# Patient Record
Sex: MALE | Race: WHITE | NOT HISPANIC OR LATINO | Employment: OTHER | URBAN - METROPOLITAN AREA
[De-identification: names, ages, dates, MRNs, and addresses within clinical notes are randomized per-mention and may not be internally consistent; named-entity substitution may affect disease eponyms.]

---

## 2017-03-13 ENCOUNTER — DOCUMENTATION ONLY (OUTPATIENT)
Dept: OTHER | Facility: CLINIC | Age: 57
End: 2017-03-13

## 2017-03-13 DIAGNOSIS — Z71.89 ACP (ADVANCE CARE PLANNING): Chronic | ICD-10-CM

## 2017-10-13 DIAGNOSIS — I10 ESSENTIAL HYPERTENSION, BENIGN: ICD-10-CM

## 2017-10-14 NOTE — TELEPHONE ENCOUNTER
lisinopril (PRINIVIL,ZESTRIL) 20 MG tablet      Last Written Prescription Date: 11/1/16  Last Fill Quantity: 90, # refills: 3  Last Office Visit with G, [facility] or [facility] prescribing provider: 11/01/16       Potassium   Date Value Ref Range Status   11/01/2016 4.8 3.4 - 5.3 mmol/L Final     Creatinine   Date Value Ref Range Status   11/01/2016 0.93 0.66 - 1.25 mg/dL Final     BP Readings from Last 3 Encounters:   11/01/16 138/78   08/04/15 148/80   06/10/15 144/86

## 2017-10-16 RX ORDER — LISINOPRIL 20 MG/1
TABLET ORAL
Qty: 90 TABLET | Refills: 0 | Status: SHIPPED | OUTPATIENT
Start: 2017-10-16 | End: 2018-04-02

## 2018-01-04 DIAGNOSIS — I10 ESSENTIAL HYPERTENSION, BENIGN: ICD-10-CM

## 2018-01-05 NOTE — TELEPHONE ENCOUNTER
Requested Prescriptions   Pending Prescriptions Disp Refills     lisinopril (PRINIVIL/ZESTRIL) 20 MG tablet [Pharmacy Med Name: LISINOPRIL  20MG  TAB]  Last Written Prescription Date:  10/16/2017  Last Fill Quantity: 90,  # refills: 0   Last Office Visit with [facility], [facility] or [facility] prescribing provider:  11/01/2016   Future Office Visit:      90 tablet      Sig: TAKE 1 TABLET BY MOUTH  DAILY    ACE Inhibitors (Including Combos) Protocol Failed    1/4/2018  8:19 PM       Failed - Blood pressure under 140/90    BP Readings from Last 3 Encounters:   11/01/16 138/78   08/04/15 148/80   06/10/15 144/86                Failed - Recent or future visit with authorizing provider's specialty    Patient had office visit in the last year or has a visit in the next 30 days with authorizing provider.  See chart review.              Failed - Normal serum creatinine on file in past 12 months    Recent Labs   Lab Test  11/01/16   1039   CR  0.93            Failed - Normal serum potassium on file in past 12 months    Recent Labs   Lab Test  11/01/16   1039   POTASSIUM  4.8            Passed - Patient is age 18 or older

## 2018-01-08 RX ORDER — LISINOPRIL 20 MG/1
TABLET ORAL
Qty: 90 TABLET | OUTPATIENT
Start: 2018-01-08

## 2018-01-08 NOTE — TELEPHONE ENCOUNTER
Routing refill request to provider for review/approval because:  Vera given x1 and patient did not follow up, please advise  Patient needs to be seen because it has been more than 1 year since last office visit.

## 2018-04-02 ENCOUNTER — OFFICE VISIT (OUTPATIENT)
Dept: INTERNAL MEDICINE | Facility: CLINIC | Age: 58
End: 2018-04-02
Payer: COMMERCIAL

## 2018-04-02 VITALS
HEIGHT: 70 IN | SYSTOLIC BLOOD PRESSURE: 130 MMHG | OXYGEN SATURATION: 97 % | TEMPERATURE: 98 F | WEIGHT: 169.3 LBS | HEART RATE: 87 BPM | BODY MASS INDEX: 24.24 KG/M2 | DIASTOLIC BLOOD PRESSURE: 80 MMHG | RESPIRATION RATE: 13 BRPM

## 2018-04-02 DIAGNOSIS — I10 ESSENTIAL HYPERTENSION, BENIGN: Primary | ICD-10-CM

## 2018-04-02 DIAGNOSIS — Z12.11 COLON CANCER SCREENING: ICD-10-CM

## 2018-04-02 DIAGNOSIS — I82.4Y2 DEEP VEIN THROMBOSIS (DVT) OF PROXIMAL VEIN OF LEFT LOWER EXTREMITY, UNSPECIFIED CHRONICITY (H): ICD-10-CM

## 2018-04-02 DIAGNOSIS — Z12.5 SPECIAL SCREENING FOR MALIGNANT NEOPLASM OF PROSTATE: ICD-10-CM

## 2018-04-02 DIAGNOSIS — E78.5 HYPERLIPIDEMIA LDL GOAL <130: ICD-10-CM

## 2018-04-02 LAB
ALBUMIN SERPL-MCNC: 3.9 G/DL (ref 3.4–5)
ALP SERPL-CCNC: 66 U/L (ref 40–150)
ALT SERPL W P-5'-P-CCNC: 53 U/L (ref 0–70)
ANION GAP SERPL CALCULATED.3IONS-SCNC: 8 MMOL/L (ref 3–14)
AST SERPL W P-5'-P-CCNC: 18 U/L (ref 0–45)
BILIRUB SERPL-MCNC: 2.3 MG/DL (ref 0.2–1.3)
BUN SERPL-MCNC: 11 MG/DL (ref 7–30)
CALCIUM SERPL-MCNC: 8.8 MG/DL (ref 8.5–10.1)
CHLORIDE SERPL-SCNC: 103 MMOL/L (ref 94–109)
CHOLEST SERPL-MCNC: 216 MG/DL
CO2 SERPL-SCNC: 28 MMOL/L (ref 20–32)
CREAT SERPL-MCNC: 0.79 MG/DL (ref 0.66–1.25)
GFR SERPL CREATININE-BSD FRML MDRD: >90 ML/MIN/1.7M2
GLUCOSE SERPL-MCNC: 115 MG/DL (ref 70–99)
HDLC SERPL-MCNC: 76 MG/DL
LDLC SERPL CALC-MCNC: 126 MG/DL
NONHDLC SERPL-MCNC: 140 MG/DL
POTASSIUM SERPL-SCNC: 4.4 MMOL/L (ref 3.4–5.3)
PROT SERPL-MCNC: 6.8 G/DL (ref 6.8–8.8)
PSA SERPL-ACNC: 3.35 UG/L (ref 0–4)
SODIUM SERPL-SCNC: 139 MMOL/L (ref 133–144)
TRIGL SERPL-MCNC: 71 MG/DL

## 2018-04-02 PROCEDURE — G0103 PSA SCREENING: HCPCS | Performed by: INTERNAL MEDICINE

## 2018-04-02 PROCEDURE — 80061 LIPID PANEL: CPT | Performed by: INTERNAL MEDICINE

## 2018-04-02 PROCEDURE — 36415 COLL VENOUS BLD VENIPUNCTURE: CPT | Performed by: INTERNAL MEDICINE

## 2018-04-02 PROCEDURE — 99214 OFFICE O/P EST MOD 30 MIN: CPT | Performed by: INTERNAL MEDICINE

## 2018-04-02 PROCEDURE — 80053 COMPREHEN METABOLIC PANEL: CPT | Performed by: INTERNAL MEDICINE

## 2018-04-02 RX ORDER — LISINOPRIL 20 MG/1
TABLET ORAL
Qty: 90 TABLET | Refills: 3 | Status: SHIPPED | OUTPATIENT
Start: 2018-04-02 | End: 2019-05-08

## 2018-04-02 ASSESSMENT — PAIN SCALES - GENERAL: PAINLEVEL: NO PAIN (0)

## 2018-04-02 NOTE — PROGRESS NOTES
SUBJECTIVE:   Kasi Krause is a 57 year old male who presents to clinic today for the following health issues:    Hypertension Follow-up      Outpatient blood pressures are not being checked.    Low Salt Diet: not monitoring salt      Amount of exercise or physical activity: None    Problems taking medications regularly: No    Medication side effects: none    Diet: regular (no restrictions)    Problem list and histories reviewed & adjusted, as indicated.  Additional history: as documented    Patient Active Problem List   Diagnosis     Hyperlipidemia LDL goal <130     Essential hypertension, benign     Male erectile disorder     ACP (advance care planning)     Deep vein thrombosis (DVT) of proximal vein of left lower extremity, unspecified chronicity (H)     Past Surgical History:   Procedure Laterality Date     broken finger pinky [       COLONOSCOPY  2014     ORTHOPEDIC SURGERY       SOFT TISSUE SURGERY  2000    Torn left achilles tendon       Social History   Substance Use Topics     Smoking status: Never Smoker     Smokeless tobacco: Never Used     Alcohol use 1.5 - 2.0 oz/week     Family History   Problem Relation Age of Onset     Family history unknown: Yes     DIABETES Mother      DIABETES Father          Current Outpatient Prescriptions   Medication Sig Dispense Refill     lisinopril (PRINIVIL/ZESTRIL) 20 MG tablet TAKE 1 TABLET BY MOUTH  DAILY 90 tablet 0     VITAMIN D, CHOLECALCIFEROL, PO Take 5,000 Units by mouth daily       ASPIRIN PO Take 81 mg by mouth once       ORDER FOR DME Equipment being ordered: compression stocking- one pair 1 Device 0     ORDER FOR DME Compression stockings-1 pair with 30-40MM pressure 1 Application 1     No Known Allergies  BP Readings from Last 3 Encounters:   11/01/16 138/78   08/04/15 148/80   06/10/15 144/86    Wt Readings from Last 3 Encounters:   11/01/16 170 lb 1.6 oz (77.2 kg)   08/04/15 168 lb 8 oz (76.4 kg)   06/10/15 170 lb (77.1 kg)           Labs reviewed in  "EPIC    Reviewed and updated as needed this visit by clinical staff       Reviewed and updated as needed this visit by Provider         ROS:  CONSTITUTIONAL: NEGATIVE for fever, chills, change in weight  INTEGUMENTARY/SKIN: NEGATIVE for worrisome rashes, moles or lesions  ENT/MOUTH: NEGATIVE for ear, mouth and throat problems  RESP: NEGATIVE for significant cough or SOB  CV: NEGATIVE for chest pain, palpitations or peripheral edema  GI: NEGATIVE for nausea, abdominal pain, heartburn, or change in bowel habits  : NEGATIVE for frequency, dysuria, or hematuria  MUSCULOSKELETAL: NEGATIVE for significant arthralgias or myalgia  HEME: NEGATIVE for bleeding problems  PSYCHIATRIC: NEGATIVE for changes in mood or affect    OBJECTIVE:                                                    /80  Pulse 87  Temp 98  F (36.7  C) (Oral)  Resp 13  Ht 5' 10\" (1.778 m)  Wt 169 lb 4.8 oz (76.8 kg)  SpO2 97%  BMI 24.29 kg/m2  Body mass index is 24.29 kg/(m^2).  GENERAL: healthy, alert and no distress  RESP: lungs clear to auscultation - no rales, no rhonchi, no wheezes  CV: regular rates and rhythm, normal S1 S2, no S3 or S4 and no murmur, no click or rub -  MS: extremities- no gross deformities noted, trace edema  NEURO: strength and tone- normal, sensory exam- grossly normal, mentation- intact, speech- normal, reflexes- symmetric  PSYCH: Alert and oriented times 3; speech- coherent , normal rate and volume; able to articulate logical thoughts, able to abstract reason, no tangential thoughts, no hallucinations or delusions, affect- normal     ASSESSMENT/PLAN:                                                      (I10) Essential hypertension, benign  (primary encounter diagnosis)  Comment: Stable on current therapy continue with meds as ordered  Plan: lisinopril (PRINIVIL/ZESTRIL) 20 MG tablet,         Comprehensive metabolic panel            (E78.5) Hyperlipidemia LDL goal <130  Comment: Labs ordered fasting per " routine  Plan: Comprehensive metabolic panel, Lipid Profile            (I82.4Y2) Deep vein thrombosis (DVT) of proximal vein of left lower extremity, unspecified chronicity (H)  Comment: No recurrence continue with aspirin daily.  Plan:     (Z12.5) Special screening for malignant neoplasm of prostate  Comment: Ordered as routine screening per age  Plan: PSA, screen            (Z12.11) Colon cancer screening  Comment: Advised and discussed updated colonoscopy per  Plan: Fecal colorectal cancer screen (FIT)              See Patient Instructions and was advised that he should complete physical examination at which time updated completion of laboratory tests including hepatitis C and hemoglobin study will be done.  She was also advised about the benefits of the shingles vaccination    Kasi Cassidy MD  [facility]    25 minutes spent with this patient, face to face, discussing treatment options for listed problems above as well as side effects of appropriate medications.  Counseling time extended beyond 50% of the clinic visit.  Medication dosing, treatment plan and follow-up were discussed. Also reviewed need for primary care testing for patient.

## 2018-04-02 NOTE — MR AVS SNAPSHOT
After Visit Summary   4/2/2018    Kasi Krause    MRN: [id number]           Patient Information     Date Of Birth          1960        Visit Information        Provider Department      4/2/2018 8:20 AM Kasi Cassidy MD [facility]        Today's Diagnoses     Essential hypertension, benign    -  1    Hyperlipidemia LDL goal <130        Deep vein thrombosis (DVT) of proximal vein of left lower extremity, unspecified chronicity (H)        Special screening for malignant neoplasm of prostate        Colon cancer screening           Follow-ups after your visit        Follow-up notes from your care team     Return if symptoms worsen or fail to improve, for Physical Exam.      Future tests that were ordered for you today     Open Future Orders        Priority Expected Expires Ordered    Fecal colorectal cancer screen (FIT) Routine 4/23/2018 6/25/2018 4/2/2018            Who to contact     If you have questions or need follow up information about today's clinic visit or your schedule please contact [facility] directly at [phone].  Normal or non-critical lab and imaging results will be communicated to you by [employer], letter or phone within 4 business days after the clinic has received the results. If you do not hear from us within 7 days, please contact the clinic through [employer] or phone. If you have a critical or abnormal lab result, we will notify you by phone as soon as possible.  Submit refill requests through [employer] or call your pharmacy and they will forward the refill request to us. Please allow 3 business days for your refill to be completed.          Additional Information About Your Visit        MyChart Information     [employer] gives you secure access to your electronic health record. If you see a primary care provider, you can also send messages to your care team and make appointments. If you have questions, please call your primary  "care clinic.  If you do not have a primary care provider, please call [phone] and they will assist you.        Care EveryWhere ID     This is your Care EveryWhere ID. This could be used by other organizations to access your [place] medical records  [id number]        Your Vitals Were     Pulse Temperature Respirations Height Pulse Oximetry BMI (Body Mass Index)    87 98  F (36.7  C) (Oral) 13 5' 10\" (1.778 m) 97% 24.29 kg/m2       Blood Pressure from Last 3 Encounters:   04/02/18 130/80   11/01/16 138/78   08/04/15 148/80    Weight from Last 3 Encounters:   04/02/18 169 lb 4.8 oz (76.8 kg)   11/01/16 170 lb 1.6 oz (77.2 kg)   08/04/15 168 lb 8 oz (76.4 kg)              We Performed the Following     Comprehensive metabolic panel     Lipid Profile     PSA, screen          Today's Medication Changes          These changes are accurate as of 4/2/18  8:51 AM.  If you have any questions, ask your nurse or doctor.               These medicines have changed or have updated prescriptions.        Dose/Directions    lisinopril 20 MG tablet   Commonly known as:  PRINIVIL/ZESTRIL   This may have changed:  See the new instructions.   Used for:  Essential hypertension, benign   Changed by:  Ksai Cassidy MD        TAKE 1 TABLET BY MOUTH  DAILY   Quantity:  90 tablet   Refills:  3            Where to get your medicines      These medications were sent to [facility] - [address], [facility] [address]     Phone:  [phone]     lisinopril 20 MG tablet                Primary Care Provider Office Phone # Fax #    Kasi Cassidy [phone]       [address] [place] [id number]        Equal Access to Services     JUAN RAMON FELIZ : Analia pinto Sofaraz, waaxda luqadaha, qaybta kaalmada dorina, branden kingsley. So [facility] [phone].    ATENCIÓN: Si habla español, tiene a de souza disposición servicios " kash de asistencia lingüística. Barry urias [phone].    We comply with applicable federal civil rights laws and [place] laws. We do not discriminate on the basis of race, color, national origin, age, disability, sex, sexual orientation, or gender identity.            Thank you!     Thank you for choosing [facility]  for your care. Our goal is always to provide you with excellent care. Hearing back from our patients is one way we can continue to improve our services. Please take a few minutes to complete the written survey that you may receive in the mail after your visit with us. Thank you!             Your Updated Medication List - Protect others around you: Learn how to safely use, store and throw away your medicines at www.disposemymeds.org.          This list is accurate as of 4/2/18  8:51 AM.  Always use your most recent med list.                   Brand Name Dispense Instructions for use Diagnosis    ASPIRIN PO      Take 81 mg by mouth once        lisinopril 20 MG tablet    PRINIVIL/ZESTRIL    90 tablet    TAKE 1 TABLET BY MOUTH  DAILY    Essential hypertension, benign       order for DME     1 Application    Compression stockings-1 pair with 30-40MM pressure    DVT (deep venous thrombosis), unspecified laterality       order for DME     1 Device    Equipment being ordered: compression stocking- one pair    DVT (deep venous thrombosis), unspecified laterality       VITAMIN D (CHOLECALCIFEROL) PO      Take 5,000 Units by mouth daily

## 2018-04-02 NOTE — LETTER
[facility]  [address] [address]  [phone]      4/2/2018       Kasi Krause  [address] [facility] [address]        Dear Kasi,    I am pleased to inform you that your routine blood work including your sodium, potassium, calcium and kidney function tests are all normal.    One of you liver function tests is just slightly abnormal but stable and should be rechecked here in the clinic in 6 months with a follow-up visit with me.  I will look forward to seeing you at that time and please call to make an appointment.    Your blood sugar function test is also slightly abnormal and elevated and should be rechecked here in the clinic in 6 months with a follow-up visit with me, fasting.  I will look forward to seeing you at that time and please call to make an appointment.  In the meantime, please work on your diet limiting your carbohydrates and getting some good, regular exercise.    Your total and LDL cholesterol levels are slightly high and could be treated more aggressively.  Please follow-up with me to discuss your further options if you are interested.    In addition, your PSA or prostate screening antigen is stable and should be repeated annually.      Sincerely,      Kasi Cassidy MD  Internal Medicine

## 2018-08-10 ENCOUNTER — E-VISIT (OUTPATIENT)
Dept: INTERNAL MEDICINE | Facility: CLINIC | Age: 58
End: 2018-08-10
Payer: COMMERCIAL

## 2018-08-10 DIAGNOSIS — J02.0 STREPTOCOCCAL SORE THROAT: Primary | ICD-10-CM

## 2018-08-10 DIAGNOSIS — J02.0 STREPTOCOCCAL SORE THROAT: ICD-10-CM

## 2018-08-10 LAB
DEPRECATED S PYO AG THROAT QL EIA: NORMAL
SPECIMEN SOURCE: NORMAL

## 2018-08-10 PROCEDURE — 99444 ZZC PHYSICIAN ONLINE EVALUATION & MANAGEMENT SERVICE: CPT | Performed by: INTERNAL MEDICINE

## 2018-08-10 PROCEDURE — 87880 STREP A ASSAY W/OPTIC: CPT | Performed by: INTERNAL MEDICINE

## 2018-08-10 PROCEDURE — 87081 CULTURE SCREEN ONLY: CPT | Performed by: INTERNAL MEDICINE

## 2018-08-11 LAB
BACTERIA SPEC CULT: NORMAL
SPECIMEN SOURCE: NORMAL

## 2019-05-08 ENCOUNTER — MYC REFILL (OUTPATIENT)
Dept: INTERNAL MEDICINE | Facility: CLINIC | Age: 59
End: 2019-05-08

## 2019-05-08 DIAGNOSIS — I10 ESSENTIAL HYPERTENSION, BENIGN: ICD-10-CM

## 2019-05-08 RX ORDER — LISINOPRIL 20 MG/1
TABLET ORAL
Qty: 30 TABLET | Refills: 0 | Status: SHIPPED | OUTPATIENT
Start: 2019-05-08 | End: 2019-06-04

## 2019-05-08 NOTE — LETTER
[facility]  [address] [address]  [phone]            Kasi Krause  [address] [facility] [address]        May 8, 2019    Dear Kasi,    While refilling your prescription today, we noticed that you are due for an appointment with your provider.  We will refill your prescription for 30 days, but a follow-up appointment must be made before any additional refills can be approved.     Taking care of your health is important to us and we look forward to seeing you in the near future.  Please call us at [phone] or [phone] (or use [employer]) to schedule an appointment.     Please disregard this notice if you have already made an appointment.    Sincerely,        [facility]

## 2019-05-08 NOTE — TELEPHONE ENCOUNTER
"Requested Prescriptions   Pending Prescriptions Disp Refills     lisinopril (PRINIVIL/ZESTRIL) 20 MG tablet 90 tablet 3     Sig: TAKE 1 TABLET BY MOUTH  DAILY       ACE Inhibitors (Including Combos) Protocol Failed - 5/8/2019  9:40 AM        Failed - Blood pressure under 140/90 in past 12 months     BP Readings from Last 3 Encounters:   04/02/18 130/80   11/01/16 138/78   08/04/15 148/80                 Failed - Recent (12 mo) or future (30 days) visit within the authorizing provider's specialty     Patient had office visit in the last 12 months or has a visit in the next 30 days with authorizing provider or within the authorizing provider's specialty.  See \"Patient Info\" tab in inbasket, or \"Choose Columns\" in Meds & Orders section of the refill encounter.              Failed - Normal serum creatinine on file in past 12 months     Recent Labs   Lab Test 04/02/18  0859   CR 0.79             Failed - Normal serum potassium on file in past 12 months     Recent Labs   Lab Test 04/02/18  0859   POTASSIUM 4.4             Passed - Medication is active on med list        Passed - Patient is age 18 or older        Medication is being filled for 1 time refill only due to:  Patient needs to be seen because it has been more than one year since last visit.    "

## 2019-06-02 ENCOUNTER — MYC MEDICAL ADVICE (OUTPATIENT)
Dept: INTERNAL MEDICINE | Facility: CLINIC | Age: 59
End: 2019-06-02

## 2019-06-04 ENCOUNTER — OFFICE VISIT (OUTPATIENT)
Dept: INTERNAL MEDICINE | Facility: CLINIC | Age: 59
End: 2019-06-04
Payer: COMMERCIAL

## 2019-06-04 VITALS
RESPIRATION RATE: 15 BRPM | HEART RATE: 91 BPM | SYSTOLIC BLOOD PRESSURE: 130 MMHG | OXYGEN SATURATION: 97 % | TEMPERATURE: 97.4 F | WEIGHT: 169.2 LBS | BODY MASS INDEX: 24.22 KG/M2 | HEIGHT: 70 IN | DIASTOLIC BLOOD PRESSURE: 70 MMHG

## 2019-06-04 DIAGNOSIS — I82.4Y2 DEEP VEIN THROMBOSIS (DVT) OF PROXIMAL VEIN OF LEFT LOWER EXTREMITY, UNSPECIFIED CHRONICITY (H): ICD-10-CM

## 2019-06-04 DIAGNOSIS — I10 ESSENTIAL HYPERTENSION, BENIGN: ICD-10-CM

## 2019-06-04 DIAGNOSIS — Z12.5 SCREENING PSA (PROSTATE SPECIFIC ANTIGEN): ICD-10-CM

## 2019-06-04 DIAGNOSIS — E78.5 HYPERLIPIDEMIA LDL GOAL <130: ICD-10-CM

## 2019-06-04 DIAGNOSIS — Z12.11 COLON CANCER SCREENING: ICD-10-CM

## 2019-06-04 DIAGNOSIS — Z00.00 ENCOUNTER FOR ROUTINE ADULT HEALTH EXAMINATION WITHOUT ABNORMAL FINDINGS: Primary | ICD-10-CM

## 2019-06-04 LAB
ALBUMIN SERPL-MCNC: 4 G/DL (ref 3.4–5)
ALP SERPL-CCNC: 59 U/L (ref 40–150)
ALT SERPL W P-5'-P-CCNC: 32 U/L (ref 0–70)
ANION GAP SERPL CALCULATED.3IONS-SCNC: 7 MMOL/L (ref 3–14)
AST SERPL W P-5'-P-CCNC: 15 U/L (ref 0–45)
BILIRUB SERPL-MCNC: 1.1 MG/DL (ref 0.2–1.3)
BUN SERPL-MCNC: 9 MG/DL (ref 7–30)
CALCIUM SERPL-MCNC: 8.6 MG/DL (ref 8.5–10.1)
CHLORIDE SERPL-SCNC: 109 MMOL/L (ref 94–109)
CHOLEST SERPL-MCNC: 222 MG/DL
CO2 SERPL-SCNC: 23 MMOL/L (ref 20–32)
CREAT SERPL-MCNC: 0.73 MG/DL (ref 0.66–1.25)
GFR SERPL CREATININE-BSD FRML MDRD: >90 ML/MIN/{1.73_M2}
GLUCOSE SERPL-MCNC: 87 MG/DL (ref 70–99)
HDLC SERPL-MCNC: 77 MG/DL
HGB BLD-MCNC: 16 G/DL (ref 13.3–17.7)
LDLC SERPL CALC-MCNC: 126 MG/DL
NONHDLC SERPL-MCNC: 145 MG/DL
POTASSIUM SERPL-SCNC: 4.6 MMOL/L (ref 3.4–5.3)
PROT SERPL-MCNC: 7.1 G/DL (ref 6.8–8.8)
PSA SERPL-ACNC: 3.08 UG/L (ref 0–4)
SODIUM SERPL-SCNC: 139 MMOL/L (ref 133–144)
TRIGL SERPL-MCNC: 96 MG/DL

## 2019-06-04 PROCEDURE — G0103 PSA SCREENING: HCPCS | Performed by: INTERNAL MEDICINE

## 2019-06-04 PROCEDURE — 80061 LIPID PANEL: CPT | Performed by: INTERNAL MEDICINE

## 2019-06-04 PROCEDURE — 80053 COMPREHEN METABOLIC PANEL: CPT | Performed by: INTERNAL MEDICINE

## 2019-06-04 PROCEDURE — 85018 HEMOGLOBIN: CPT | Performed by: INTERNAL MEDICINE

## 2019-06-04 PROCEDURE — 99396 PREV VISIT EST AGE 40-64: CPT | Performed by: INTERNAL MEDICINE

## 2019-06-04 PROCEDURE — 36415 COLL VENOUS BLD VENIPUNCTURE: CPT | Performed by: INTERNAL MEDICINE

## 2019-06-04 RX ORDER — CETIRIZINE HYDROCHLORIDE 10 MG/1
10 TABLET ORAL DAILY
COMMUNITY
End: 2024-01-11

## 2019-06-04 RX ORDER — LISINOPRIL 20 MG/1
TABLET ORAL
Qty: 90 TABLET | Refills: 3 | Status: SHIPPED | OUTPATIENT
Start: 2019-06-04 | End: 2020-07-21

## 2019-06-04 ASSESSMENT — MIFFLIN-ST. JEOR: SCORE: 1593.74

## 2019-06-04 NOTE — LETTER
[facility]  [address] [address]  [phone]      6/4/2019       Kasi Karuse  [address] [facility] [address]        Dear Kasi,    I am pleased to inform you that your routine blood work including your hemoglobin, sodium, potassium, calcium, kidney and liver function tests are all normal.    Your cholesterol is slightly high and could be treated more aggressively with better diet, exercise and weight loss.  Please follow-up with me to discuss your further medication options/changes if you are interested otherwise I would recheck this level in 6 months after some good exercise and dietary changes.    In addition, your PSA or prostate screening antigen is stable and should be repeated annually.    Sincerely,      Kasi Cassidy MD  Internal Medicine

## 2019-06-04 NOTE — PROGRESS NOTES
SUBJECTIVE:   CC: Kasi Krause is an 58 year old male who presents for preventive health visit.     Answers for HPI/ROS submitted by the patient on 6/2/2019   Annual Exam:  Frequency of exercise:: 2-3 days/week  Getting at least 3 servings of Calcium per day:: Yes  Diet:: Regular (no restrictions)  Taking medications regularly:: Yes  Medication side effects:: Not applicable  Bi-annual eye exam:: Yes  Dental care twice a year:: NO  Sleep apnea or symptoms of sleep apnea:: None  Additional concerns today:: No  Duration of exercise:: 15-30 minutes    Today's PHQ-2 Score:   PHQ-2 ( 1999 [employer]) 6/2/2019 4/2/2018   Q1: Little interest or pleasure in doing things 0 0   Q2: Feeling down, depressed or hopeless 0 0   PHQ-2 Score 0 0   Q1: Little interest or pleasure in doing things Not at all -   Q2: Feeling down, depressed or hopeless Not at all -   PHQ-2 Score 0 -       Abuse: Current or Past(Physical, Sexual or Emotional)- No  Do you feel safe in your environment? Yes    Social History     Tobacco Use     Smoking status: Never Smoker     Smokeless tobacco: Never Used   Substance Use Topics     Alcohol use: Yes     Alcohol/week: 1.5 - 2.0 oz     If you drink alcohol do you typically have >3 drinks per day or >7 drinks per week? No                      Last PSA:   PSA   Date Value Ref Range Status   04/02/2018 3.35 0 - 4 ug/L Final     Comment:     Assay Method:  Chemiluminescence using Siemens Vista analyzer       Reviewed orders with patient. Reviewed health maintenance and updated orders accordingly - Yes    Reviewed and updated as needed this visit by clinical staff       Reviewed and updated as needed this visit by Provider        ROS:  CONSTITUTIONAL: NEGATIVE for fever, chills, change in weight  INTEGUMENTARY/SKIN: NEGATIVE for worrisome rashes, moles or lesions  EYES: NEGATIVE for vision changes or irritation  ENT: NEGATIVE for ear, mouth and throat problems  RESP: NEGATIVE for significant cough or SOB  CV:  "NEGATIVE for chest pain, palpitations or peripheral edema  GI: NEGATIVE for nausea, abdominal pain, heartburn, or change in bowel habits   male: negative for dysuria, hematuria, decreased urinary stream, erectile dysfunction, urethral discharge  MUSCULOSKELETAL: NEGATIVE for significant arthralgias or myalgia  NEURO: NEGATIVE for weakness, dizziness or paresthesias  PSYCHIATRIC: NEGATIVE for changes in mood or affect    OBJECTIVE:   /70   Pulse 91   Temp 97.4  F (36.3  C) (Oral)   Resp 15   Ht 1.778 m (5' 10\")   Wt 76.7 kg (169 lb 3.2 oz)   SpO2 97%   BMI 24.28 kg/m    EXAM:  GENERAL: healthy, alert and no distress  EYES: Eyes grossly normal to inspection, PERRL and conjunctivae and sclerae normal  HENT: ear canals and TM's normal, nose and mouth without ulcers or lesions  NECK: no adenopathy, no asymmetry, masses, or scars and thyroid normal to palpation  RESP: lungs clear to auscultation - no rales, rhonchi or wheezes  CV: regular rate and rhythm, normal S1 S2, no S3 or S4, no murmur, click or rub, no peripheral edema and peripheral pulses strong  ABDOMEN: soft, nontender, no hepatosplenomegaly, no masses and bowel sounds normal  RECTAL: normal sphincter tone, no rectal masses, prostate normal size, smooth, nontender without nodules or masses, small external skin tags noted consistent with prior hemorrhoids.  .  MS: no gross musculoskeletal defects noted, no edema  NEURO: Normal strength and tone, mentation intact and speech normal  PSYCH: mentation appears normal, affect normal/bright    ASSESSMENT/PLAN:   1. Encounter for routine adult health examination without abnormal findings  Advised shingles vaccination for patient is up-to-date.  - Hemoglobin  - Comprehensive metabolic panel  - Lipid Profile    Recommended HIV screening as well as hepatitis C screening patient not interested at present time and will check with insurance    2. Essential hypertension, benign  Stable on therapy continuing with " "current medical management  - lisinopril (PRINIVIL/ZESTRIL) 20 MG tablet; TAKE 1 TABLET BY MOUTH  DAILY  Dispense: 90 tablet; Refill: 3  - Comprehensive metabolic panel    3. Deep vein thrombosis (DVT) of proximal vein of left lower extremity, unspecified chronicity (H)  Prior disease followed by Dr. Gonzalez 2015    4. Hyperlipidemia LDL goal <130  Labs ordered as fasting per routine  - Lipid Profile    5. Screening PSA (prostate specific antigen)  Ordered PSA as routine screening per age  - PSA, screen    6. Colon cancer screening  Prior colonoscopy reviewed and will be due for update next year  - Fecal colorectal cancer screen (FIT); Future    COUNSELING:  Reviewed preventive health counseling, as reflected in patient instructions       Regular exercise       Healthy diet/nutrition    Estimated body mass index is 24.29 kg/m  as calculated from the following:    Height as of 4/2/18: 1.778 m (5' 10\").    Weight as of 4/2/18: 76.8 kg (169 lb 4.8 oz).     reports that he has never smoked. He has never used smokeless tobacco.    Counseling Resources:  ATP IV Guidelines  Pooled Cohorts Equation Calculator  FRAX Risk Assessment  ICSI Preventive Guidelines  Dietary Guidelines for Americans, 2010  [employer]'s MyPlate  ASA Prophylaxis  Lung CA Screening    Kasi Cassidy MD  [facility]    THE MEDICATION LIST HAS BEEN FULLY RECONCILED BY THE M.D. AND THE NURSING STAFF.    "

## 2019-10-03 ENCOUNTER — HEALTH MAINTENANCE LETTER (OUTPATIENT)
Age: 59
End: 2019-10-03

## 2020-07-07 DIAGNOSIS — I10 ESSENTIAL HYPERTENSION, BENIGN: ICD-10-CM

## 2020-07-21 RX ORDER — LISINOPRIL 20 MG/1
TABLET ORAL
Qty: 90 TABLET | Refills: 0 | Status: SHIPPED | OUTPATIENT
Start: 2020-07-21 | End: 2020-08-25

## 2020-07-21 NOTE — TELEPHONE ENCOUNTER
Patient called back made a video visit with provider on 7/23/2020 for medication check any questions call patient back at [phone]

## 2020-08-25 ENCOUNTER — VIRTUAL VISIT (OUTPATIENT)
Dept: INTERNAL MEDICINE | Facility: CLINIC | Age: 60
End: 2020-08-25
Payer: COMMERCIAL

## 2020-08-25 DIAGNOSIS — I10 ESSENTIAL HYPERTENSION, BENIGN: ICD-10-CM

## 2020-08-25 DIAGNOSIS — E78.5 HYPERLIPIDEMIA LDL GOAL <130: Primary | ICD-10-CM

## 2020-08-25 DIAGNOSIS — Z12.5 SCREENING PSA (PROSTATE SPECIFIC ANTIGEN): ICD-10-CM

## 2020-08-25 DIAGNOSIS — Z12.11 COLON CANCER SCREENING: ICD-10-CM

## 2020-08-25 PROCEDURE — 99213 OFFICE O/P EST LOW 20 MIN: CPT | Mod: 95 | Performed by: INTERNAL MEDICINE

## 2020-08-25 RX ORDER — LISINOPRIL 20 MG/1
20 TABLET ORAL DAILY
Qty: 90 TABLET | Refills: 3 | Status: SHIPPED | OUTPATIENT
Start: 2020-08-25 | End: 2021-08-04

## 2020-08-25 NOTE — PROGRESS NOTES
"Kasi Krause is a 59 year old male who is being evaluated via a billable telephone visit.      The patient has been notified of following:     \"This telephone visit will be conducted via a call between you and your physician/provider. We have found that certain health care needs can be provided without the need for a physical exam.  This service lets us provide the care you need with a short phone conversation.  If a prescription is necessary we can send it directly to your pharmacy.  If lab work is needed we can place an order for that and you can then stop by our lab to have the test done at a later time.    Telephone visits are billed at different rates depending on your insurance coverage. During this emergency period, for some insurers they may be billed the same as an in-person visit.  Please reach out to your insurance provider with any questions.    If during the course of the call the physician/provider feels a telephone visit is not appropriate, you will not be charged for this service.\"    Patient has given verbal consent for Telephone visit?  Yes    What phone number would you like to be contacted at? [phone]    How would you like to obtain your AVS? [facility]    Subjective     Kasi Krause is a 59 year old male who presents via phone visit today for the following health issues:    HPI    Hypertension Follow-up      Do you check your blood pressure regularly outside of the clinic? No     Are you following a low salt diet? No    Are your blood pressures ever more than 140 on the top number (systolic) OR more   than 90 on the bottom number (diastolic), for example 140/90? No      How many servings of fruits and vegetables do you eat daily?  2-3    On average, how many sweetened beverages do you drink each day (Examples: soda, juice, sweet tea, etc.  Do NOT count diet or artificially sweetened beverages)?   0    How many days per week do you exercise enough to make your heart beat faster? 3 or " less    How many minutes a day do you exercise enough to make your heart beat faster? 10 - 19    How many days per week do you miss taking your medication? 0       Review of Systems   CONSTITUTIONAL: NEGATIVE for fever, chills, change in weight  ENT/MOUTH: NEGATIVE for ear, mouth and throat problems  RESP: NEGATIVE for significant cough or SOB  CV: NEGATIVE for chest pain, palpitations or peripheral edema  GI: NEGATIVE for nausea, abdominal pain, heartburn, or change in bowel habits  : NEGATIVE for frequency, dysuria, or hematuria  MUSCULOSKELETAL: NEGATIVE for significant arthralgias or myalgia  NEURO: NEGATIVE for weakness, dizziness or paresthesias  HEME: NEGATIVE for bleeding problems  PSYCHIATRIC: NEGATIVE for changes in mood or affect       Objective      Vitals:  No vitals were obtained today due to virtual visit.    healthy, alert and no distress  PSYCH: Alert and oriented times 3; coherent speech, normal   rate and volume, able to articulate logical thoughts, able   to abstract reason, no tangential thoughts, no hallucinations   or delusions  His affect is normal  RESP: No cough, no audible wheezing, able to talk in full sentences  Remainder of exam unable to be completed due to telephone visits        Assessment/Plan:    Assessment & Plan     Essential hypertension, benign  Stable on therapy, advised checking here in clinic  - lisinopril (ZESTRIL) 20 MG tablet; Take 1 tablet (20 mg) by mouth daily  - Hemoglobin; Future    Hyperlipidemia LDL goal <130  Labs as fasting  - Comprehensive metabolic panel; Future  - Lipid Profile; Future    Screening PSA (prostate specific antigen)  Ordered as screening  - PSA, screen; Future    Colon cancer screening  ADVISED COLONOSCOPY FOR ROUTINE PREVENTATIVE CARE.  - GASTROENTEROLOGY ADULT REF PROCEDURE ONLY; Future     See Patient Instructions    Return in about 1 month (around 9/25/2020) for Physical Exam. Advised Shingles vaccination.    Kasi Cassidy MD  [place]  [facility]    Phone call duration:  14 minutes

## 2020-11-07 ENCOUNTER — HEALTH MAINTENANCE LETTER (OUTPATIENT)
Age: 60
End: 2020-11-07

## 2021-03-04 ENCOUNTER — MYC MEDICAL ADVICE (OUTPATIENT)
Dept: INTERNAL MEDICINE | Facility: CLINIC | Age: 61
End: 2021-03-04

## 2021-08-04 DIAGNOSIS — I10 ESSENTIAL HYPERTENSION, BENIGN: ICD-10-CM

## 2021-08-04 RX ORDER — LISINOPRIL 20 MG/1
TABLET ORAL
Qty: 90 TABLET | Refills: 0 | Status: SHIPPED | OUTPATIENT
Start: 2021-08-04 | End: 2021-12-14

## 2021-08-04 NOTE — CONFIDENTIAL NOTE
Medication is being filled for 1 time refill only due to:  Patient needs to be seen because it has been more than one year since last visit. sent [employer] message to schedule a fasting appt.

## 2021-09-05 ENCOUNTER — HEALTH MAINTENANCE LETTER (OUTPATIENT)
Age: 61
End: 2021-09-05

## 2021-11-02 ENCOUNTER — TRANSFERRED RECORDS (OUTPATIENT)
Dept: HEALTH INFORMATION MANAGEMENT | Facility: CLINIC | Age: 61
End: 2021-11-02
Payer: COMMERCIAL

## 2021-12-13 ENCOUNTER — MYC REFILL (OUTPATIENT)
Dept: INTERNAL MEDICINE | Facility: CLINIC | Age: 61
End: 2021-12-13
Payer: COMMERCIAL

## 2021-12-13 DIAGNOSIS — I10 ESSENTIAL HYPERTENSION, BENIGN: ICD-10-CM

## 2021-12-13 RX ORDER — LISINOPRIL 20 MG/1
20 TABLET ORAL DAILY
Qty: 90 TABLET | Refills: 0 | OUTPATIENT
Start: 2021-12-13

## 2021-12-13 NOTE — TELEPHONE ENCOUNTER
This refill request is a duplicate previously sent to pharmacy or currently in review.  Refused medication to pharmacy as duplicate.   Gracia Ford RN

## 2021-12-13 NOTE — TELEPHONE ENCOUNTER
Routing refill request to provider for review/approval because:  Labs not current:    Creatinine   Date Value Ref Range Status   06/04/2019 0.73 0.66 - 1.25 mg/dL Final     Potassium   Date Value Ref Range Status   06/04/2019 4.6 3.4 - 5.3 mmol/L Final     Failed protocol BP:   BP Readings from Last 3 Encounters:   06/04/19 130/70   04/02/18 130/80   11/01/16 138/78     Jessi SCHMITT RN  [facility]

## 2021-12-14 RX ORDER — LISINOPRIL 20 MG/1
20 TABLET ORAL DAILY
Qty: 30 TABLET | Refills: 0 | Status: SHIPPED | OUTPATIENT
Start: 2021-12-14 | End: 2022-02-02

## 2021-12-14 NOTE — TELEPHONE ENCOUNTER
May inform patient that I have filled a 30-day supply.  Patient has not been seen in the clinic since August 2000.  He will need a clinic appointment prior to any future refills.

## 2021-12-26 ENCOUNTER — HEALTH MAINTENANCE LETTER (OUTPATIENT)
Age: 61
End: 2021-12-26

## 2022-02-02 ENCOUNTER — VIRTUAL VISIT (OUTPATIENT)
Dept: INTERNAL MEDICINE | Facility: CLINIC | Age: 62
End: 2022-02-02
Payer: COMMERCIAL

## 2022-02-02 DIAGNOSIS — I10 ESSENTIAL HYPERTENSION, BENIGN: ICD-10-CM

## 2022-02-02 PROCEDURE — 99213 OFFICE O/P EST LOW 20 MIN: CPT | Mod: TEL | Performed by: INTERNAL MEDICINE

## 2022-02-02 RX ORDER — LISINOPRIL 20 MG/1
20 TABLET ORAL DAILY
Qty: 90 TABLET | Refills: 0 | Status: SHIPPED | OUTPATIENT
Start: 2022-02-02 | End: 2022-04-14

## 2022-02-02 NOTE — PROGRESS NOTES
Kasi is a 61 year old who is being evaluated via a billable telephone visit.      What phone number would you like to be contacted at? [phone]  How would you like to obtain your AVS? [facility]    Assessment & Plan     (I10) Essential hypertension, benign  Comment: This condition is currently controlled on the current medical regimen.  Continue current therapy.   Discussed current hypertension treatment guidelines, including indications for treatment and treatment options.  Discussed the importance for aggressive management of HTN to prevent vascular complications later.  Recommended lower fat, lower carbohydrate, and lower sodium (<2000 mg)diet.  Discussed required intervals for follow up on HTN, lab studies.  Recommened pt. occasionally follow their blood pressures outside the clinic to ensure that BPs are remaining within guidelines, and to contact me if the readings are not within guidelines on a regular basis so we can adjust treatment as needed.   Plan: lisinopril (ZESTRIL) 20 MG tablet             The decision about taking a daily preventative aspirin can be made after assessing the 10 year cardiac risk score.   If above 10%, then daily low dose preventative aspirin recommended, unless therea re side effects from aspirin.       Return in about 5 weeks (around 3/9/2022) for in person Office visit, With your primary MD, Annual physical, Blood pressure.  appt already planned for 3/9/22  come fasting for labs to be drawn that day.     Santi Crews MD  [facility]   Kasi is a 61 year old who presents for the following health issues     HPI         Pt needs medications refilled.     Last in person visit June 2019  Last virtual visti August 2020.       Hypertension:  History of hypertension, on medication.  No reported side effects from medications.    Reviewed last 6 BP readings in chart:  BP Readings from Last 6 Encounters:   06/04/19 130/70   04/02/18  130/80   11/01/16 138/78   08/04/15 148/80   06/10/15 144/86   05/05/15 134/80     No active cardiac complaints or symptoms with exercise.     **I reviewed the information recorded in the patient's EPIC chart (including but not limited to medical history, surgical history, family history, problem list, medication list, and allergy list) and updated the information as indicated based on the patients reported information.         Review of Systems   Constitutional, HEENT, cardiovascular, pulmonary, gi and gu systems are negative, except as otherwise noted.      Objective           Vitals:  No vitals were obtained today due to virtual visit.    Physical Exam   healthy, alert and no distress  PSYCH: Alert and oriented times 3; coherent speech, normal   rate and volume, able to articulate logical thoughts, able   to abstract reason, no tangential thoughts, no hallucinations   or delusions  His affect is normal  RESP: No cough, no audible wheezing, able to talk in full sentences  Remainder of exam unable to be completed due to telephone visits                Phone call duration: 12:45 minutes

## 2022-02-02 NOTE — PATIENT INSTRUCTIONS
"*  Continue all medications at the same doses.  Contact your usual pharmacy if you need refills.     Return to see Dr. Cassidy as planned in the office for a physical on 3/9/22, sooner if needed.  Use [employer] or Call [phone] to schedule this appointment.     5 GOALS TO PREVENT VASCULAR DISEASE:     1.  Aggressive blood pressure control, under 130/80 ideally.  Using medications if needed.    Your blood pressure is under good control    BP Readings from Last 4 Encounters:   06/04/19 130/70   04/02/18 130/80   11/01/16 138/78   08/04/15 148/80       2.  Aggressive LDL cholesterol (\"bad cholesterol\") lowering as indicated.    Your goal is an LDL under 130 for sure, preferably under 100.  (If you have diabetes or previous vascular disease, the the LDL goals would be under 100 for sure, preferably under 70.)    New guidelines identify four high-risk groups who could benefit from statins:   *people with pre-existing heart disease, such as those who have had a heart attack;   *people ages 40 to 75 who have diabetes of any type  *patients ages 40 to 75 with at least a 7.5% risk of developing cardiovascular disease over the next decade, according to a formula described in the guidelines  *patients with the sort of super-high cholesterol that sometimes runs in families, as evidenced by an LDL of 190 milligrams per deciliter or higher    Your cholesterol levels are well controlled.    Recent Labs   Lab Test 06/04/19  0851 04/02/18  0859 11/01/16  1039 12/10/13  0814   CHOL 222* 216*   < > 203*   HDL 77 76   < > 84   * 126*   < > 99   TRIG 96 71   < > 97   CHOLHDLRATIO  --   --   --  2.4    < > = values in this interval not displayed.       3.  Aggressive diabetic prevention, screening and/or management.      You do not have diabetes as of the most recent blood tests.     4.  No smoking    5.  Consider daily preventative aspirin over age 50 if you have enough cardiac risk factors to place you at higher risk for the " presence of vascular disease.    If you have any reason not to take aspirin such easy bruising or bleeding, stomach problems, other anticoagulant medications, or any other side effects, then you should not take Aspirin.     --Based on your current cardiac disease risk profile at this time, you probably do not need to take daily preventative aspirin.    If your 10 year risk for a cardiac event is ove 10%, then you should consider taking a daily aspirin.     Your 10 year cardiac risk score can be recalculated depending on the updated labs and blood pressure values.

## 2022-04-11 DIAGNOSIS — I10 ESSENTIAL HYPERTENSION, BENIGN: ICD-10-CM

## 2022-04-13 DIAGNOSIS — I10 ESSENTIAL HYPERTENSION, BENIGN: ICD-10-CM

## 2022-04-13 NOTE — TELEPHONE ENCOUNTER
Routing refill request to provider for review/approval because:  Labs not current:    BP Readings from Last 3 Encounters:   06/04/19 130/70   04/02/18 130/80   11/01/16 138/78     Creatinine   Date Value Ref Range Status   06/04/2019 0.73 0.66 - 1.25 mg/dL Final     Potassium   Date Value Ref Range Status   06/04/2019 4.6 3.4 - 5.3 mmol/L Final     Patient had appt 2/2/22  Gracia Ford RN

## 2022-04-14 RX ORDER — LISINOPRIL 20 MG/1
TABLET ORAL
Qty: 90 TABLET | Refills: 3 | OUTPATIENT
Start: 2022-04-14

## 2022-04-14 RX ORDER — LISINOPRIL 20 MG/1
20 TABLET ORAL DAILY
Qty: 30 TABLET | Refills: 0 | Status: SHIPPED | OUTPATIENT
Start: 2022-04-14 | End: 2022-09-13

## 2022-04-14 NOTE — TELEPHONE ENCOUNTER
This refill is a duplicate of something already sent to the pharmacy or another refill already in process.   Sridevi Elizabeth RN  [facility]

## 2022-09-12 ASSESSMENT — ENCOUNTER SYMPTOMS
EYE PAIN: 0
PARESTHESIAS: 0
HEMATOCHEZIA: 0
ARTHRALGIAS: 1
DYSURIA: 0
MYALGIAS: 0
SHORTNESS OF BREATH: 0
HEADACHES: 0
SORE THROAT: 0
DIARRHEA: 0
DIZZINESS: 0
JOINT SWELLING: 0
HEARTBURN: 1
FREQUENCY: 0
PALPITATIONS: 0
FEVER: 0
ABDOMINAL PAIN: 0
HEMATURIA: 0
WEAKNESS: 0
NAUSEA: 0
CONSTIPATION: 0
CHILLS: 0
NERVOUS/ANXIOUS: 0
COUGH: 0

## 2022-09-13 ENCOUNTER — OFFICE VISIT (OUTPATIENT)
Dept: INTERNAL MEDICINE | Facility: CLINIC | Age: 62
End: 2022-09-13
Payer: COMMERCIAL

## 2022-09-13 VITALS
SYSTOLIC BLOOD PRESSURE: 138 MMHG | DIASTOLIC BLOOD PRESSURE: 84 MMHG | BODY MASS INDEX: 24.15 KG/M2 | TEMPERATURE: 98 F | HEIGHT: 70 IN | HEART RATE: 96 BPM | RESPIRATION RATE: 15 BRPM | WEIGHT: 168.7 LBS | OXYGEN SATURATION: 95 %

## 2022-09-13 DIAGNOSIS — Z00.00 ROUTINE GENERAL MEDICAL EXAMINATION AT A HEALTH CARE FACILITY: Primary | ICD-10-CM

## 2022-09-13 DIAGNOSIS — Z12.5 SCREENING PSA (PROSTATE SPECIFIC ANTIGEN): ICD-10-CM

## 2022-09-13 DIAGNOSIS — Z12.11 COLON CANCER SCREENING: ICD-10-CM

## 2022-09-13 DIAGNOSIS — Z11.4 SCREENING FOR HIV (HUMAN IMMUNODEFICIENCY VIRUS): ICD-10-CM

## 2022-09-13 DIAGNOSIS — I10 ESSENTIAL HYPERTENSION, BENIGN: ICD-10-CM

## 2022-09-13 DIAGNOSIS — I82.4Y2 DEEP VEIN THROMBOSIS (DVT) OF PROXIMAL VEIN OF LEFT LOWER EXTREMITY, UNSPECIFIED CHRONICITY (H): ICD-10-CM

## 2022-09-13 DIAGNOSIS — E78.5 HYPERLIPIDEMIA LDL GOAL <130: ICD-10-CM

## 2022-09-13 DIAGNOSIS — Z11.59 NEED FOR HEPATITIS C SCREENING TEST: ICD-10-CM

## 2022-09-13 LAB
ALBUMIN SERPL-MCNC: 4.1 G/DL (ref 3.4–5)
ALP SERPL-CCNC: 62 U/L (ref 40–150)
ALT SERPL W P-5'-P-CCNC: 32 U/L (ref 0–70)
ANION GAP SERPL CALCULATED.3IONS-SCNC: 10 MMOL/L (ref 3–14)
AST SERPL W P-5'-P-CCNC: 18 U/L (ref 0–45)
BILIRUB SERPL-MCNC: 1.3 MG/DL (ref 0.2–1.3)
BUN SERPL-MCNC: 10 MG/DL (ref 7–30)
CALCIUM SERPL-MCNC: 8.8 MG/DL (ref 8.5–10.1)
CHLORIDE BLD-SCNC: 105 MMOL/L (ref 94–109)
CHOLEST SERPL-MCNC: 217 MG/DL
CO2 SERPL-SCNC: 24 MMOL/L (ref 20–32)
CREAT SERPL-MCNC: 0.6 MG/DL (ref 0.66–1.25)
ERYTHROCYTE [DISTWIDTH] IN BLOOD BY AUTOMATED COUNT: 16.8 % (ref 10–15)
FASTING STATUS PATIENT QL REPORTED: YES
GFR SERPL CREATININE-BSD FRML MDRD: >90 ML/MIN/1.73M2
GLUCOSE BLD-MCNC: 85 MG/DL (ref 70–99)
HCT VFR BLD AUTO: 44.9 % (ref 40–53)
HDLC SERPL-MCNC: 88 MG/DL
HGB BLD-MCNC: 15.3 G/DL (ref 13.3–17.7)
LDLC SERPL CALC-MCNC: 113 MG/DL
MCH RBC QN AUTO: 34.2 PG (ref 26.5–33)
MCHC RBC AUTO-ENTMCNC: 34.1 G/DL (ref 31.5–36.5)
MCV RBC AUTO: 100 FL (ref 78–100)
NONHDLC SERPL-MCNC: 129 MG/DL
PLATELET # BLD AUTO: 155 10E3/UL (ref 150–450)
POTASSIUM BLD-SCNC: 3.7 MMOL/L (ref 3.4–5.3)
PROT SERPL-MCNC: 7 G/DL (ref 6.8–8.8)
PSA SERPL-MCNC: 4.26 UG/L (ref 0–4)
RBC # BLD AUTO: 4.48 10E6/UL (ref 4.4–5.9)
SODIUM SERPL-SCNC: 139 MMOL/L (ref 133–144)
TRIGL SERPL-MCNC: 80 MG/DL
WBC # BLD AUTO: 2.9 10E3/UL (ref 4–11)

## 2022-09-13 PROCEDURE — 36415 COLL VENOUS BLD VENIPUNCTURE: CPT | Performed by: INTERNAL MEDICINE

## 2022-09-13 PROCEDURE — 99396 PREV VISIT EST AGE 40-64: CPT | Performed by: INTERNAL MEDICINE

## 2022-09-13 PROCEDURE — 85027 COMPLETE CBC AUTOMATED: CPT | Performed by: INTERNAL MEDICINE

## 2022-09-13 PROCEDURE — 80053 COMPREHEN METABOLIC PANEL: CPT | Performed by: INTERNAL MEDICINE

## 2022-09-13 PROCEDURE — 80061 LIPID PANEL: CPT | Performed by: INTERNAL MEDICINE

## 2022-09-13 PROCEDURE — G0103 PSA SCREENING: HCPCS | Performed by: INTERNAL MEDICINE

## 2022-09-13 RX ORDER — LISINOPRIL 20 MG/1
20 TABLET ORAL DAILY
Qty: 90 TABLET | Refills: 3 | Status: SHIPPED | OUTPATIENT
Start: 2022-09-13 | End: 2023-11-13

## 2022-09-13 ASSESSMENT — ENCOUNTER SYMPTOMS
SHORTNESS OF BREATH: 0
EYE PAIN: 0
WEAKNESS: 0
NAUSEA: 0
SORE THROAT: 0
ARTHRALGIAS: 1
COUGH: 0
CHILLS: 0
NERVOUS/ANXIOUS: 0
HEADACHES: 0
ABDOMINAL PAIN: 0
DYSURIA: 0
DIARRHEA: 0
HEMATURIA: 0
HEARTBURN: 1
DIZZINESS: 0
PALPITATIONS: 0
JOINT SWELLING: 0
MYALGIAS: 0
PARESTHESIAS: 0
CONSTIPATION: 0
FREQUENCY: 0
HEMATOCHEZIA: 0
FEVER: 0

## 2022-09-13 NOTE — PROGRESS NOTES
SUBJECTIVE:   CC: Kasi Krause is an 61 year old male who presents for preventative health visit.     Patient has been advised of split billing requirements and indicates understanding: Yes  Healthy Habits:     Getting at least 3 servings of Calcium per day:  NO    Bi-annual eye exam:  Yes    Dental care twice a year:  NO    Sleep apnea or symptoms of sleep apnea:  None    Diet:  Regular (no restrictions)    Frequency of exercise:  6-7 days/week    PHQ-2 Total Score: 0    Additional concerns today:  No    PROBLEMS TO ADD ON...  1. Intermittent  GERD, relieved by taking Pepcid with resolve.  2. Right knee pain, intermittent. Not tolerating Glucosamine.    Today's PHQ-2 Score:   PHQ-2 ( 1999 Pf[employer]) 9/12/2022   Q1: Little interest or pleasure in doing things 0   Q2: Feeling down, depressed or hopeless 0   PHQ-2 Score 0   PHQ-2 Total Score (12-17 Years)- Positive if 3 or more points; Administer PHQ-A if positive -   Q1: Little interest or pleasure in doing things Not at all   Q2: Feeling down, depressed or hopeless Not at all   PHQ-2 Score 0       Abuse: Current or Past(Physical, Sexual or Emotional)- No  Do you feel safe in your environment? Yes    Have you ever done Advance Care Planning? (For example, a Health Directive, POLST, or a discussion with a medical provider or your loved ones about your wishes): No, advance care planning information given to patient to review.  Patient declined advance care planning discussion at this time.    Social History     Tobacco Use     Smoking status: Never Smoker     Smokeless tobacco: Never Used   Substance Use Topics     Alcohol use: Yes     Alcohol/week: 2.5 - 3.3 standard drinks         Alcohol Use 9/12/2022   Prescreen: >3 drinks/day or >7 drinks/week? No   Prescreen: >3 drinks/day or >7 drinks/week? -       Last PSA:   PSA   Date Value Ref Range Status   06/04/2019 3.08 0 - 4 ug/L Final     Comment:     Assay Method:  Chemiluminescence using Siemens Vista analyzer  "      Reviewed orders with patient. Reviewed health maintenance and updated orders accordingly - Yes  Lab work is in process    Reviewed and updated as needed this visit by clinical staff                    Reviewed and updated as needed this visit by Provider                     Review of Systems   Constitutional: Negative for chills and fever.   HENT: Positive for congestion. Negative for ear pain, hearing loss and sore throat.    Eyes: Negative for pain and visual disturbance.   Respiratory: Negative for cough and shortness of breath.    Cardiovascular: Negative for chest pain, palpitations and peripheral edema.   Gastrointestinal: Positive for heartburn. Negative for abdominal pain, constipation, diarrhea, hematochezia and nausea.   Genitourinary: Negative for dysuria, frequency, genital sores, hematuria, impotence, penile discharge and urgency.   Musculoskeletal: Positive for arthralgias. Negative for joint swelling and myalgias.   Skin: Negative for rash.   Neurological: Negative for dizziness, weakness, headaches and paresthesias.   Psychiatric/Behavioral: Negative for mood changes. The patient is not nervous/anxious.        Current Outpatient Medications   Medication     cetirizine (ZYRTEC) 10 MG tablet     lisinopril (ZESTRIL) 20 MG tablet     ORDER FOR DME     ORDER FOR DME     VITAMIN D, CHOLECALCIFEROL, PO     No current facility-administered medications for this visit.         OBJECTIVE:   /84   Pulse 96   Temp 98  F (36.7  C) (Temporal)   Resp 15   Ht 1.778 m (5' 10\")   Wt 76.5 kg (168 lb 11.2 oz)   SpO2 95%   BMI 24.21 kg/m      Physical Exam  GENERAL: healthy, alert and no distress  EYES: Eyes grossly normal to inspection, PERRL and conjunctivae and sclerae normal  HENT: ear canals and TM's normal, nose and mouth without ulcers or lesions  NECK: no adenopathy, no asymmetry, masses, or scars and thyroid normal to palpation  RESP: lungs clear to auscultation - no rales, rhonchi or " wheezes  CV: regular rate and rhythm, normal S1 S2, no S3 or S4, no murmur, click or rub, no peripheral edema and peripheral pulses strong  ABDOMEN: soft, nontender, no hepatosplenomegaly, no masses and bowel sounds normal  RECTAL: normal sphincter tone, no rectal masses, prostate normal size, smooth, nontender without nodules or masses  MS: no gross musculoskeletal defects noted  NEURO: No focal changes  PSYCH: mentation appears normal, affect normal/bright      ASSESSMENT/PLAN:   (Z00.00) Routine general medical examination at a health care facility  (primary encounter diagnosis)  Comment: Advise updated influenza and COVID vaccines when available.  Plan: CBC with platelets, Comprehensive metabolic         panel, Lipid Profile              (E78.5) Hyperlipidemia LDL goal <130  Comment: Labs ordered as fasting per routine.  Plan: Lipid Profile            (I10) Essential hypertension, benign  Comment: Stable on therapy and continue with current medical management, recheck blood pressure 6-month  Plan: Comprehensive metabolic panel, lisinopril         (ZESTRIL) 20 MG tablet            (I82.4Y2) Deep vein thrombosis (DVT) of proximal vein of left lower extremity, unspecified chronicity (H)  Comment: Note as prior history.  Continuing with supportive care  Plan:     (Z12.11) Colon cancer screening  Comment: Prior colonoscopy reviewed and recommended repeat.  Order placed to [facility]  Plan: Fecal colorectal cancer screen FIT, Colonoscopy        Screening  Referral            (Z12.5) Screening PSA (prostate specific antigen)  Comment: Ordered as routine screening based on stable exam  Plan: Prostate Specific Antigen Screen            (Z11.4) Screening for HIV (human immunodeficiency virus)  Comment: Offered his routine screening but declined  Plan:     (Z11.59) Need for hepatitis C screening test  Comment: Offered his routine screening but declined  Plan:     Patient has been advised of split billing  "requirements and indicates understanding: Yes    COUNSELING:   Reviewed preventive health counseling, as reflected in patient instructions       Regular exercise       Healthy diet/nutrition    Estimated body mass index is 24.28 kg/m  as calculated from the following:    Height as of 6/4/19: 1.778 m (5' 10\").    Weight as of 6/4/19: 76.7 kg (169 lb 3.2 oz).         He reports that he has never smoked. He has never used smokeless tobacco.      Counseling Resources:  ATP IV Guidelines  Pooled Cohorts Equation Calculator  FRAX Risk Assessment  ICSI Preventive Guidelines  Dietary Guidelines for Americans, 2010  USDA's MyPlate  ASA Prophylaxis  Lung CA Screening    Kasi Cassidy MD  [facility]  "

## 2022-10-23 ENCOUNTER — HEALTH MAINTENANCE LETTER (OUTPATIENT)
Age: 62
End: 2022-10-23

## 2023-06-26 ENCOUNTER — HOSPITAL ENCOUNTER (OUTPATIENT)
Facility: CLINIC | Age: 63
Setting detail: OBSERVATION
Discharge: HOME | End: 2023-06-27
Attending: EMERGENCY MEDICINE | Admitting: HOSPITALIST
Payer: COMMERCIAL

## 2023-06-26 ENCOUNTER — APPOINTMENT (OUTPATIENT)
Dept: CT IMAGING | Facility: CLINIC | Age: 63
End: 2023-06-26
Payer: COMMERCIAL

## 2023-06-26 ENCOUNTER — NURSE TRIAGE (OUTPATIENT)
Dept: INTERNAL MEDICINE | Facility: CLINIC | Age: 63
End: 2023-06-26

## 2023-06-26 DIAGNOSIS — I26.09 OTHER ACUTE PULMONARY EMBOLISM WITH ACUTE COR PULMONALE (H): ICD-10-CM

## 2023-06-26 LAB
ANION GAP SERPL CALCULATED.3IONS-SCNC: 17 MMOL/L (ref 7–15)
ATRIAL RATE - MUSE: 100 BPM
BASOPHILS # BLD AUTO: 0 10E3/UL (ref 0–0.2)
BASOPHILS NFR BLD AUTO: 0 %
BUN SERPL-MCNC: 12.6 MG/DL (ref 8–23)
CALCIUM SERPL-MCNC: 9.6 MG/DL (ref 8.8–10.2)
CHLORIDE SERPL-SCNC: 99 MMOL/L (ref 98–107)
CREAT SERPL-MCNC: 0.81 MG/DL (ref 0.67–1.17)
D DIMER PPP FEU-MCNC: 9.13 UG/ML FEU (ref 0–0.5)
DEPRECATED HCO3 PLAS-SCNC: 22 MMOL/L (ref 22–29)
DIASTOLIC BLOOD PRESSURE - MUSE: NORMAL MMHG
EOSINOPHIL # BLD AUTO: 0 10E3/UL (ref 0–0.7)
EOSINOPHIL NFR BLD AUTO: 0 %
ERYTHROCYTE [DISTWIDTH] IN BLOOD BY AUTOMATED COUNT: 15.3 % (ref 10–15)
GFR SERPL CREATININE-BSD FRML MDRD: >90 ML/MIN/1.73M2
GLUCOSE SERPL-MCNC: 106 MG/DL (ref 70–99)
HCT VFR BLD AUTO: 46.4 % (ref 40–53)
HGB BLD-MCNC: 16.1 G/DL (ref 13.3–17.7)
HOLD SPECIMEN: NORMAL
HOLD SPECIMEN: NORMAL
IMM GRANULOCYTES # BLD: 0 10E3/UL
IMM GRANULOCYTES NFR BLD: 0 %
INTERPRETATION ECG - MUSE: NORMAL
LYMPHOCYTES # BLD AUTO: 1 10E3/UL (ref 0.8–5.3)
LYMPHOCYTES NFR BLD AUTO: 17 %
MAGNESIUM SERPL-MCNC: 1.6 MG/DL (ref 1.7–2.3)
MCH RBC QN AUTO: 34.5 PG (ref 26.5–33)
MCHC RBC AUTO-ENTMCNC: 34.7 G/DL (ref 31.5–36.5)
MCV RBC AUTO: 100 FL (ref 78–100)
MONOCYTES # BLD AUTO: 0.7 10E3/UL (ref 0–1.3)
MONOCYTES NFR BLD AUTO: 13 %
NEUTROPHILS # BLD AUTO: 4.1 10E3/UL (ref 1.6–8.3)
NEUTROPHILS NFR BLD AUTO: 70 %
NRBC # BLD AUTO: 0 10E3/UL
NRBC BLD AUTO-RTO: 0 /100
P AXIS - MUSE: 55 DEGREES
PLATELET # BLD AUTO: 127 10E3/UL (ref 150–450)
POTASSIUM SERPL-SCNC: 4.5 MMOL/L (ref 3.4–5.3)
PR INTERVAL - MUSE: 136 MS
QRS DURATION - MUSE: 102 MS
QT - MUSE: 382 MS
QTC - MUSE: 492 MS
R AXIS - MUSE: 29 DEGREES
RADIOLOGIST FLAGS: ABNORMAL
RBC # BLD AUTO: 4.66 10E6/UL (ref 4.4–5.9)
SODIUM SERPL-SCNC: 138 MMOL/L (ref 136–145)
SYSTOLIC BLOOD PRESSURE - MUSE: NORMAL MMHG
T AXIS - MUSE: 38 DEGREES
TROPONIN T SERPL HS-MCNC: 28 NG/L
TROPONIN T SERPL HS-MCNC: 32 NG/L
TROPONIN T SERPL HS-MCNC: 34 NG/L
VENTRICULAR RATE- MUSE: 100 BPM
WBC # BLD AUTO: 5.8 10E3/UL (ref 4–11)

## 2023-06-26 PROCEDURE — 83735 ASSAY OF MAGNESIUM: CPT | Performed by: PHYSICIAN ASSISTANT

## 2023-06-26 PROCEDURE — 99285 EMERGENCY DEPT VISIT HI MDM: CPT | Mod: 25

## 2023-06-26 PROCEDURE — 71275 CT ANGIOGRAPHY CHEST: CPT

## 2023-06-26 PROCEDURE — 84484 ASSAY OF TROPONIN QUANT: CPT | Performed by: EMERGENCY MEDICINE

## 2023-06-26 PROCEDURE — 80048 BASIC METABOLIC PNL TOTAL CA: CPT | Performed by: EMERGENCY MEDICINE

## 2023-06-26 PROCEDURE — 96366 THER/PROPH/DIAG IV INF ADDON: CPT

## 2023-06-26 PROCEDURE — 99223 1ST HOSP IP/OBS HIGH 75: CPT | Performed by: PHYSICIAN ASSISTANT

## 2023-06-26 PROCEDURE — 36415 COLL VENOUS BLD VENIPUNCTURE: CPT

## 2023-06-26 PROCEDURE — 250N000011 HC RX IP 250 OP 636: Mod: JZ

## 2023-06-26 PROCEDURE — 250N000009 HC RX 250: Performed by: EMERGENCY MEDICINE

## 2023-06-26 PROCEDURE — 36415 COLL VENOUS BLD VENIPUNCTURE: CPT | Performed by: EMERGENCY MEDICINE

## 2023-06-26 PROCEDURE — 96365 THER/PROPH/DIAG IV INF INIT: CPT

## 2023-06-26 PROCEDURE — 250N000011 HC RX IP 250 OP 636: Performed by: EMERGENCY MEDICINE

## 2023-06-26 PROCEDURE — 85025 COMPLETE CBC W/AUTO DIFF WBC: CPT | Performed by: EMERGENCY MEDICINE

## 2023-06-26 PROCEDURE — 84484 ASSAY OF TROPONIN QUANT: CPT | Mod: 91 | Performed by: PHYSICIAN ASSISTANT

## 2023-06-26 PROCEDURE — G0378 HOSPITAL OBSERVATION PER HR: HCPCS

## 2023-06-26 PROCEDURE — 84484 ASSAY OF TROPONIN QUANT: CPT | Mod: 91

## 2023-06-26 PROCEDURE — 36415 COLL VENOUS BLD VENIPUNCTURE: CPT | Performed by: PHYSICIAN ASSISTANT

## 2023-06-26 PROCEDURE — 93005 ELECTROCARDIOGRAM TRACING: CPT

## 2023-06-26 PROCEDURE — 96374 THER/PROPH/DIAG INJ IV PUSH: CPT | Mod: 59

## 2023-06-26 PROCEDURE — 85379 FIBRIN DEGRADATION QUANT: CPT

## 2023-06-26 RX ORDER — IOPAMIDOL 755 MG/ML
500 INJECTION, SOLUTION INTRAVASCULAR ONCE
Status: COMPLETED | OUTPATIENT
Start: 2023-06-26 | End: 2023-06-26

## 2023-06-26 RX ORDER — ACETAMINOPHEN 325 MG/1
650 TABLET ORAL EVERY 6 HOURS PRN
Status: DISCONTINUED | OUTPATIENT
Start: 2023-06-26 | End: 2023-06-27 | Stop reason: HOSPADM

## 2023-06-26 RX ORDER — ACETAMINOPHEN 650 MG/1
650 SUPPOSITORY RECTAL EVERY 6 HOURS PRN
Status: DISCONTINUED | OUTPATIENT
Start: 2023-06-26 | End: 2023-06-27 | Stop reason: HOSPADM

## 2023-06-26 RX ORDER — AMOXICILLIN 250 MG
1 CAPSULE ORAL 2 TIMES DAILY PRN
Status: DISCONTINUED | OUTPATIENT
Start: 2023-06-26 | End: 2023-06-27 | Stop reason: HOSPADM

## 2023-06-26 RX ORDER — ONDANSETRON 2 MG/ML
4 INJECTION INTRAMUSCULAR; INTRAVENOUS EVERY 6 HOURS PRN
Status: DISCONTINUED | OUTPATIENT
Start: 2023-06-26 | End: 2023-06-27 | Stop reason: HOSPADM

## 2023-06-26 RX ORDER — HEPARIN SODIUM 10000 [USP'U]/100ML
0-5000 INJECTION, SOLUTION INTRAVENOUS CONTINUOUS
Status: DISCONTINUED | OUTPATIENT
Start: 2023-06-26 | End: 2023-06-26

## 2023-06-26 RX ORDER — HEPARIN SODIUM 10000 [USP'U]/100ML
0-5000 INJECTION, SOLUTION INTRAVENOUS CONTINUOUS
Status: DISCONTINUED | OUTPATIENT
Start: 2023-06-26 | End: 2023-06-27 | Stop reason: ALTCHOICE

## 2023-06-26 RX ORDER — ONDANSETRON 4 MG/1
4 TABLET, ORALLY DISINTEGRATING ORAL EVERY 6 HOURS PRN
Status: DISCONTINUED | OUTPATIENT
Start: 2023-06-26 | End: 2023-06-27 | Stop reason: HOSPADM

## 2023-06-26 RX ORDER — AMOXICILLIN 250 MG
2 CAPSULE ORAL 2 TIMES DAILY PRN
Status: DISCONTINUED | OUTPATIENT
Start: 2023-06-26 | End: 2023-06-27 | Stop reason: HOSPADM

## 2023-06-26 RX ORDER — LISINOPRIL 20 MG/1
20 TABLET ORAL EVERY EVENING
Status: DISCONTINUED | OUTPATIENT
Start: 2023-06-27 | End: 2023-06-27 | Stop reason: HOSPADM

## 2023-06-26 RX ADMIN — SODIUM CHLORIDE 79 ML: 9 INJECTION, SOLUTION INTRAVENOUS at 15:35

## 2023-06-26 RX ADMIN — HEPARIN SODIUM AND DEXTROSE 1350 UNITS/HR: 10000; 5 INJECTION INTRAVENOUS at 16:31

## 2023-06-26 RX ADMIN — IOPAMIDOL 59 ML: 755 INJECTION, SOLUTION INTRAVENOUS at 15:35

## 2023-06-26 ASSESSMENT — ACTIVITIES OF DAILY LIVING (ADL)
ADLS_ACUITY_SCORE: 35

## 2023-06-26 NOTE — ED PROVIDER NOTES
History     Chief Complaint:  Chest Pain and Tachycardia       The history is provided by the patient.      Kasi Krause is a 62 year old male with a history of DVT, hypertension, and hyperlipidemia who presents to the ED via private vehicle for evaluation of shortness of breath, fatigue, and tachycardia. Patient reports sudden onset of chest pain while driving on Saturday, 2 days prior, followed by dizziness, shortness of breath, elevated heart rate, and fatigue 15-20 mins later. He notes that his chest pain and dizziness has since resolved, but he is still experiencing shortness of breath and fatigue along with an elevated heart rate. Also notes that he gets nausea every once in a while. Denies any history of cardiac events, diabetes, COPD, or asthma. No cough, leg swelling, abdominal pain, and fever. Not on any blood thinners.    Independent Historian:   None - Patient Only    Review of External Notes:   Office visit with internal medicine on 9/13/2022    Medications:    Ascorbic Acid (VITAMIN C PO)  cetirizine (ZYRTEC) 10 MG tablet  lisinopril (ZESTRIL) 20 MG tablet  VITAMIN D, CHOLECALCIFEROL, PO  ORDER FOR DME  ORDER FOR DME        Past Medical History:    Past Medical History:   Diagnosis Date     HTN (hypertension) 1/24/2013     Hyperlipidemia LDL goal <130 1/24/2013     Male erectile disorder 11/1/2016     Other acute pulmonary embolism with acute cor pulmonale (H) 6/26/2023     Pneumonia        Past Surgical History:    Past Surgical History:   Procedure Laterality Date     broken finger pinky [       COLONOSCOPY  2014     ORTHOPEDIC SURGERY       SOFT TISSUE SURGERY  2000    Torn left achilles tendon        Physical Exam     Patient Vitals for the past 24 hrs:   BP Temp Temp src Pulse Resp SpO2 Weight   06/26/23 1530 128/88 -- -- 78 -- 98 % --   06/26/23 1430 124/85 -- -- 91 -- 97 % --   06/26/23 1415 121/84 -- -- 97 -- 96 % --   06/26/23 1345 123/87 -- -- 100 -- 97 % --   06/26/23 1319 (!) 147/98 98  F  (36.7  C) Temporal 114 18 99 % 74.8 kg (165 lb)        Physical Exam   /88   Pulse 78   Temp 98  F (36.7  C) (Temporal)   Resp 18   Wt 74.8 kg (165 lb)   SpO2 98%   BMI 23.68 kg/m     General: Well-developed and well-nourished. Patient appears comfortable.   Head: Atraumatic. Normocephalic.    EENT: PERRL. EOMI. Moist mucus membranes.   CV: Tachycardic and regular rhythm. No appreciable murmurs, rubs, or gallops.   Respiratory: Breathing comfortably on room air. Lungs clear to auscultation bilaterally without wheezes, rhonchi, or rales.  GI: Soft, non-distended. Non-tender abdomen. No rebound, rigidity, or guarding.   Msk: No LE edema.  Skin: Warm and dry. No rashes.  Neuro: Awake, alert, and conversant. No focal neurologic deficits.   Psych: Appropriate mood and affect.    Emergency Department Course   ECG  ECG taken at 1348, ECG read at 1352  Normal sinus rhythm  Prolonged QT   Rate 100 bpm. [place] interval 136 ms. QRS duration 102 ms. QT/QTc 382/492 ms. P-R-T axes 55 29 38.     Imaging:  CT Chest Pulmonary Embolism w Contrast   Final Result   Abnormal   IMPRESSION:   1.  Positive study for pulmonary embolism.   2.  Marked hepatic steatosis.      [Critical Result: Pulmonary embolism]      Finding was identified on 6/26/2023 3:44 PM.       Farhana Grant PA-C was contacted by me at 6/26/2023 3:45 PM and   verbalized understanding of the critical finding.       LIANNA HOPE MD            SYSTEM ID:  [id number]         Report per radiology    Laboratory:  Labs Ordered and Resulted from Time of ED Arrival to Time of ED Departure   BASIC METABOLIC PANEL - Abnormal       Result Value    Sodium 138      Potassium 4.5      Chloride 99      Carbon Dioxide (CO2) 22      Anion Gap 17 (*)     Urea Nitrogen 12.6      Creatinine 0.81      Calcium 9.6      Glucose 106 (*)     GFR Estimate >90     TROPONIN T, HIGH SENSITIVITY - Abnormal    Troponin T, High Sensitivity 34 (*)    CBC WITH PLATELETS AND DIFFERENTIAL -  Abnormal    WBC Count 5.8      RBC Count 4.66      Hemoglobin 16.1      Hematocrit 46.4            MCH 34.5 (*)     MCHC 34.7      RDW 15.3 (*)     Platelet Count 127 (*)     % Neutrophils 70      % Lymphocytes 17      % Monocytes 13      % Eosinophils 0      % Basophils 0      % Immature Granulocytes 0      NRBCs per 100 WBC 0      Absolute Neutrophils 4.1      Absolute Lymphocytes 1.0      Absolute Monocytes 0.7      Absolute Eosinophils 0.0      Absolute Basophils 0.0      Absolute Immature Granulocytes 0.0      Absolute NRBCs 0.0     D DIMER QUANTITATIVE - Abnormal    D-Dimer Quantitative 9.13 (*)    TROPONIN T, HIGH SENSITIVITY - Abnormal    Troponin T, High Sensitivity 32 (*)         Emergency Department Course & Assessments:  Interventions:  Medications   heparin infusion 25,000 units in D5W 250 mL ANTICOAGULANT (1,350 Units/hr Intravenous $New Bag 6/26/23 1631)   iopamidol (ISOVUE-370) solution 500 mL (59 mLs Intravenous $Given 6/26/23 1535)   CT scan flush (79 mLs Intravenous $Given 6/26/23 1535)   heparin ANTICOAGULANT Loading dose for HIGH INTENSITY TREATMENT * Give BEFORE starting heparin infusion (6,000 Units Intravenous $Given 6/26/23 1634)        Assessments and Consultations:  Patient was seen in conjunction with attending physician Dr. Munoz.    ED Course as of 06/26/23 1720   Mon Jun 26, 2023   1324 I obtained history and examined the patient as noted above.   1548 Received a call from Radiology informing me that this patient is positive for pulmonary embolism.   1615 Spoke with Cynthia Bass for Dr. Villarreal. Agreed to admit to hospitalist service.       Independent Interpretation (X-rays, CTs, rhythm strip):  None    Social Determinants of Health affecting care:   None    Disposition:  The patient was admitted to the hospital under the care of Dr. Villarreal.     Impression & Plan    Medical Decision Making:  Plan is a 62-year-old man who came in with 2 days of shortness of breath and palpitations,  reporting tachycardia from his Apple Watch.  He had an episode of chest pain on 6/24 that has since resolved.  Differential diagnosis included ACS, PE, aortic dissection, pleural effusion, acute decompensated heart failure.  Patient reports a history of DVT and recent trip to [place] about 1 month ago.  Work-up returned as above.  Troponin was elevated as well, delta troponin slightly decreased and stable.  CT PE study showed bilateral PE with evidence of right heart enlargement suspicious for right heart strain.  He will be started on heparin here with plans for admission to the hospital.    Critical Care time:  was 0 minutes for this patient excluding procedures.    Diagnosis:    ICD-10-CM    1. Other acute pulmonary embolism with acute cor pulmonale (H)  I26.09 June 26, 2023   DELMIS Robert Amanda, PA-C  06/26/23 1720

## 2023-06-26 NOTE — ED TRIAGE NOTES
Pt reports that on Saturday he was driving and noted CP that caused SOB, and dizziness. Pt reports that the pain has subsided but still feels fatigue and SOB. PT reports that he noted a high HR as well.

## 2023-06-26 NOTE — ED PROVIDER NOTES
Emergency Department Attending Supervision Note  6/26/2023  1:23 PM      I evaluated this patient in conjunction with an Advanced Practice Clinician:  aFrhana Grant PA-C      Briefly, the patient presented with sudden onset of chest pain followed by dizziness and shortness of breath while driving last Saturday, 2 days prior. He adds that he also had an episode of elevated heart rate lasting 2-3 hours. He notes that his chest pain and dizziness have resolved, but he is still experiencing shortness of breath during exertion and fatigue. Denies any recent leg swelling. No hx of CAD. Notes that he was put on Warfarin after his DVT for about 3 months.    Examination:   General: Resting comfortably on the gurney  Head:  The scalp, face, and head appear normal  Eyes:  The pupils are normal    Conjunctivae and sclera appear normal  ENT:    The nose is normal    Ears/pinnae are normal  Lungs: Clear  CV:  Normal  Neck:  Normal range of motion  MS:  Normal  Skin:  No rash or lesions noted.  Neuro: Speech is normal and fluent  Psych:  Awake. Alert.  Normal affect.      Appropriate interactions        Medical decision making:  This patient presents to the emergency department with an episode of chest pain and shortness of breath along with tachycardia this weekend.  He does have a history of DVT.  Patient was found to have a normal twelve-lead EKG, a slightly elevated cardiac troponin, and elevated D-dimer which led to CT scan of the chest which indicated pulmonary embolism.  There is some right ventricular enlargement noted consistent with RV strain.  The patient is started on unfractionated heparin and will be admitted to the hospitalist for further management.    My impression/diagnosis is:  Pulmonary embolism          Scribe Disclosure:  KELLY, FREDIS OSWALD, am serving as a scribe at 1:43 PM on 6/26/2023 to document services personally performed by Jacky Munoz MD based on my observations and the provider's statements to  me.    Jacky Munoz MD, CPE, FACEP, FAAEM  Emergency Physician         Jacky Munoz MD  06/26/23 8524

## 2023-06-26 NOTE — ED NOTES
[facility]  ED Nurse Handoff Report    ED Chief complaint: Chest Pain and Tachycardia  . ED Diagnosis:   Final diagnoses:   Other acute pulmonary embolism with acute cor pulmonale (H)       Allergies: No Known Allergies    Code Status: Full Code    Activity level - Baseline/Home:  independent.  Activity Level - Current:   independent.   Lift room needed: No.   Bariatric: No   Needed: No   Isolation: No.   Infection: Not Applicable.     Respiratory status: Room air    Vital Signs (within 30 minutes):   Vitals:    06/26/23 1345 06/26/23 1415 06/26/23 1430 06/26/23 1530   BP: 123/87 121/84 124/85 128/88   Pulse: 100 97 91 78   Resp:       Temp:       TempSrc:       SpO2: 97% 96% 97% 98%   Weight:           Cardiac Rhythm: NSR ,      Pain level: 0/10   Patient confused: No.   Patient Falls Risk: patient and family education.   Elimination Status: Has voided     Patient Report - Initial Complaint: Chest pain, SOB.   Focused Assessment: Kasi Krause is a 62 year old male with a history of DVT, hypertension, and hyperlipidemia who presents to the ED via private vehicle for evaluation of shortness of breath, fatigue, and tachycardia. Patient reports sudden onset of chest pain while driving on Saturday, 2 days prior, followed by dizziness, shortness of breath, elevated heart rate, and fatigue 15-20 mins later. He notes that his chest pain and dizziness has since resolved, but he is still experiencing shortness of breath and fatigue along with an elevated heart rate. Also notes that he gets nausea every once in a while. Denies any history of cardiac events, diabetes, COPD, or asthma. No cough, leg swelling, abdominal pain, and fever. Not on any blood thinners.     Abnormal Results:   Labs Ordered and Resulted from Time of ED Arrival to Time of ED Departure   BASIC METABOLIC PANEL - Abnormal       Result Value    Sodium 138      Potassium 4.5      Chloride 99      Carbon Dioxide (CO2) 22       Anion Gap 17 (*)     Urea Nitrogen 12.6      Creatinine 0.81      Calcium 9.6      Glucose 106 (*)     GFR Estimate >90     TROPONIN T, HIGH SENSITIVITY - Abnormal    Troponin T, High Sensitivity 34 (*)    CBC WITH PLATELETS AND DIFFERENTIAL - Abnormal    WBC Count 5.8      RBC Count 4.66      Hemoglobin 16.1      Hematocrit 46.4            MCH 34.5 (*)     MCHC 34.7      RDW 15.3 (*)     Platelet Count 127 (*)     % Neutrophils 70      % Lymphocytes 17      % Monocytes 13      % Eosinophils 0      % Basophils 0      % Immature Granulocytes 0      NRBCs per 100 WBC 0      Absolute Neutrophils 4.1      Absolute Lymphocytes 1.0      Absolute Monocytes 0.7      Absolute Eosinophils 0.0      Absolute Basophils 0.0      Absolute Immature Granulocytes 0.0      Absolute NRBCs 0.0     D DIMER QUANTITATIVE - Abnormal    D-Dimer Quantitative 9.13 (*)    TROPONIN T, HIGH SENSITIVITY        CT Chest Pulmonary Embolism w Contrast   Final Result   Abnormal   IMPRESSION:   1.  Positive study for pulmonary embolism.   2.  Marked hepatic steatosis.      [Critical Result: Pulmonary embolism]      Finding was identified on 6/26/2023 3:44 PM.       Farhana Grant PA-C was contacted by me at 6/26/2023 3:45 PM and   verbalized understanding of the critical finding.       LIANNA HOPE MD            SYSTEM ID:  [id number]          Treatments provided: See MAR  Family Comments: Wife at bedside  OBS brochure/video discussed/provided to patient:  Yes  ED Medications:   Medications   heparin infusion 25,000 units in D5W 250 mL ANTICOAGULANT (1,350 Units/hr Intravenous $New Bag 6/26/23 1631)   iopamidol (ISOVUE-370) solution 500 mL (59 mLs Intravenous $Given 6/26/23 1535)   CT scan flush (79 mLs Intravenous $Given 6/26/23 1535)   heparin ANTICOAGULANT Loading dose for HIGH INTENSITY TREATMENT * Give BEFORE starting heparin infusion (6,000 Units Intravenous $Given 6/26/23 1634)       Drips infusing:  Yes - Heparin  For the majority of  the shift this patient was Green.   Interventions performed were N/A.    Sepsis treatment initiated: No    Cares/treatment/interventions/medications to be completed following ED care: N/A    ED Nurse Name: Nathaly Riggs RN  4:40 PM     RECEIVING UNIT ED HANDOFF REVIEW    Above ED Nurse Handoff Report was reviewed: Yes  Reviewed by: Aneesh Hernandez RN on June 26, 2023 at 7:17 PM

## 2023-06-26 NOTE — MEDICATION SCRIBE - ADMISSION MEDICATION HISTORY
Pharmacist Admission Medication History    Admission medication history is complete. The information provided in this note is only as accurate as the sources available at the time of the update.    Medication reconciliation/reorder completed by provider prior to medication history? No    Information Source(s): Patient via in-person    Pertinent Information: none    Changes made to PTA medication list:    Added: None    Deleted: None    Changed: None    Medication Affordability:  Not including over the counter (OTC) medications, was there a time in the past 3 months when you did not take your medications as prescribed because of cost?: No    Allergies reviewed with patient and updates made in EHR: yes    Medication History Completed By: Jorge A Womack [facility] 6/26/2023 4:09 PM    Prior to Admission medications    Medication Sig Last Dose Taking? Auth Provider Long Term End Date   Ascorbic Acid (VITAMIN C PO) Take 500 mg by mouth daily 6/25/2023 at am Yes Reported, Patient     cetirizine (ZYRTEC) 10 MG tablet Take 10 mg by mouth daily 6/25/2023 at am Yes Reported, Patient     lisinopril (ZESTRIL) 20 MG tablet Take 1 tablet (20 mg) by mouth daily 6/25/2023 at hs Yes Kasi Cassidy MD Yes    VITAMIN D, CHOLECALCIFEROL, PO Take 5,000 Units by mouth daily 6/25/2023 at am Yes Reported, Patient     ORDER FOR DME Equipment being ordered: compression stocking- one pair   Kasi Cassidy MD     ORDER FOR DME Compression stockings-1 pair with 30-40MM pressure   Kasi Cassidy MD

## 2023-06-26 NOTE — H&P
[facility]    Hospitalist History and Physical    Name: Kasi Krause    MRN: [id number]  YOB: 1960    Age: 62 year old  Date of Admission:  6/26/2023  Date of Service (when I saw the patient): 06/26/23    Assessment & Plan   Kasi Krause is a 62 year old male with PMH for prior provoked left LE DVT previously on warfarin for 3 months (2015), hypertension, and hyperlipidemia who presents to the ED on 6/26/2023 for evaluation of shortness of breath, fatigue, tachycardia.    ED workup reveals: initially tachycardic and hypertensive, anion gap 17, glucose of 106, troponin of 34, hemoglobin 16.1, platelet count of 127, D-dimer of 9.13, EKG shows rate of 100 bpm in sinus rhythm with prolonged QTc of 492, and CT of chest shows bilateral pulmonary emboli, left worse than right, thoracic aorta is negative for dissection, right heart enlargement could indicate right heart strain.     #Bilateral PE with possible mild right heart strain   #Elevated troponin, suspect type II MI due to PE  #H/o provoked left LE DVT (2015): acute onset of central nonradiating chest pain on 6/24 while driving followed by dizziness, shortness of breath, elevated heart rate, and fatigue. Chest pain and dizziness resolved after approximately 20 minutes but other symptoms have persisted. H/o extensive DVT of left LE in 2015 thought to be due to CAM boot use at the time.  Hypercoagulable work-up at that time with Dr. Gonzalez of hematology was unremarkable. Initially tachycardic and hypertensive in the ED. D-dimer elevated at 9.13.  Initial troponin of 34 with repeat of 32. CT of chest shows bilateral pulmonary emboli, left worse than right, right heart enlargement could indicate right heart strain.   - obtain one more troponin level to monitor trend   - continue heparin gtt  - closely monitor platelet count due to being mildly thrombocytopenic at 127  - pharmacy consult for DOAC pricing   - obtain  echocardiogram  - monitor on telemetry  - not hypoxic currently, PRN supplemental oxygen   - no swelling or pain of LEs, will not obtain ultrasound to look for DVT since this would not      #Thrombocytopenia: initial platelet count of 127, previously normal. Does consume alcohol almost daily and could be contributing.   - no current evidence of bleeding, monitor   - follow CBC    #Prolonged QTc: EKG notes QTc of 492, not on any medications that should cause this and no prior EKGs to compare.  - potassium WNL  - check magnesium     #HTN: initial BP elevated, continue PTA Lisinopril with parameters     #Alcohol use: reports consuming 3-4 glasses of wine almost daily, no history of withdrawal. Discussed need for limiting or decreasing with starting anticoagulation.     Clinically Significant Risk Factors Present on Admission                # Thrombocytopenia: Lowest platelets = 127 in last 2 days, will monitor for bleeding   # Hypertension: Noted on problem list             DVT Prophylaxis: Heparin gtt   Code Status: Full Code, discussed with patient  Disposition: Expected discharge in 24-48 hours, will admit to observation    Primary Care Physician   Kais Cassidy    Chief Complaint   Shortness of breath, fatigue, tachycardia    History obtained from discussion with ED provider, Farhana Grant PA-C, chart review, and interview with patient. Patient's wife is at the bedside to give additional history.      History of Present Illness   Kasi Krause is a 62 year old male who presents with shortness of breath, fatigue, and tachycardia.  Patient states that he had onset of central nonradiating chest pain on 6/24 while driving followed by dizziness, shortness of breath, elevated heart rate, and fatigue.  Chest pain and dizziness resolved after approximately 20 minutes but he has had persistent fatigue, shortness of breath, and elevated heart rate since then.  He states he is extremely winded with just  going up a flight of stairs.  He notes maybe mild intermittent episodes of lightheadedness on 6/25.  He notes mild nausea without vomiting recently.  Denies recent fever, chills, abdominal pain, or lower extremity swelling.  The patient recently traveled to [place] and [place] from 5/11 to 5/25.  He reports the flight there was approximately 9 hours in duration and on the way back it was approximately 7.5 hours.  Denies any other long distance travel or car rides. No recent surgery or immobility. Patient does have a history of what was thought to be a provoked DVT in 2015 where he was on warfarin for 3 months and was seen by hematology at that time with unremarkable hypercoagulable work-up.  Not currently on anticoagulation.  Denies any history of CAD.  Patient believes both his parents have a history of blood clots.    Past Medical History    Past Medical History:   Diagnosis Date     HTN (hypertension) 1/24/2013     Hyperlipidemia LDL goal <130 1/24/2013     Male erectile disorder 11/1/2016     Pneumonia      Past Surgical History   Past Surgical History:   Procedure Laterality Date     broken finger pinky [       COLONOSCOPY  2014     ORTHOPEDIC SURGERY       SOFT TISSUE SURGERY  2000    Torn left achilles tendon     Prior to Admission Medications   Prior to Admission Medications   Prescriptions Last Dose Informant Patient Reported? Taking?   Ascorbic Acid (VITAMIN C PO) 6/25/2023 at am  Yes Yes   Sig: Take 500 mg by mouth daily   ORDER FOR DME   No No   Sig: Compression stockings-1 pair with 30-40MM pressure   ORDER FOR DME   No No   Sig: Equipment being ordered: compression stocking- one pair   VITAMIN D, CHOLECALCIFEROL, PO 6/25/2023 at am  Yes Yes   Sig: Take 5,000 Units by mouth daily   cetirizine (ZYRTEC) 10 MG tablet 6/25/2023 at am  Yes Yes   Sig: Take 10 mg by mouth daily   lisinopril (ZESTRIL) 20 MG tablet 6/25/2023 at hs  No Yes   Sig: Take 1 tablet (20 mg) by mouth daily      Facility-Administered  Medications: None     Allergies   No Known Allergies    Social History   Social History     Tobacco Use     Smoking status: Never     Smokeless tobacco: Never     Tobacco comments:     Cigar 3-4 times per year   Substance Use Topics     Alcohol use: Yes     Alcohol/week: 2.5 - 3.3 standard drinks of alcohol     Social History     Social History Narrative     Not on file     Family History   Family history reviewed with patient and significant for blood clots in mother and father.     Review of Systems   A Comprehensive greater than 10 system review of systems was carried out.  Pertinent positives and negatives are noted above.  Otherwise negative for contributory information.    Physical Exam   Temp: 98  F (36.7  C) Temp src: Temporal BP: 128/88 Pulse: 78   Resp: 18 SpO2: 98 % O2 Device: None (Room air)    Vital Signs with Ranges  Temp:  [98  F (36.7  C)] 98  F (36.7  C)  Pulse:  [] 78  Resp:  [18] 18  BP: (121-147)/(84-98) 128/88  SpO2:  [96 %-99 %] 98 %  165 lbs 0 oz    GEN:  Alert, oriented x 3, appears comfortable sitting on edge of gurney, pleasant, no overt distress  HEENT:  Normocephalic/atraumatic, no scleral icterus, no nasal discharge, mouth moist.  CV:  Regular rate and rhythm, no murmur or JVD.  S1 + S2 noted, no S3 or S4.  LUNGS:  Clear to auscultation bilaterally without rales/rhonchi/wheezing/retractions.  Symmetric chest rise on inhalation noted.  ABD:  Active bowel sounds, soft, non-tender/non-distended.  No rebound/guarding/rigidity.  EXT:  No LE edema or tenderness.  No cyanosis.  No acute joint synovitis noted.  SKIN:  Dry to touch, no exanthems noted in the visualized areas.  NEURO:  Symmetric muscle strength, sensation to touch grossly intact.  Coordination symmetric on general exam.  No new focal deficits appreciated.    Data   Data reviewed today:  I personally reviewed the EKG tracing showing rate of 100 bpm in sinus rhythm with prolonged QTc of 492.    Results for orders placed or  performed during the hospital encounter of 06/26/23   CT Chest Pulmonary Embolism w Contrast     Status: Abnormal   Result Value Ref Range    Radiologist flags Pulmonary embolism (AA)     Narrative    CT CHEST PULMONARY EMBOLISM WITH CONTRAST 6/26/2023 3:41 PM    CLINICAL HISTORY: Chest Pain. Tachycardia. History of deep venous  thrombosis, recent travel, elevated D-dimer, question pulmonary  embolism.     TECHNIQUE: CT angiogram chest during arterial phase injection IV  contrast. 2D and 3D MIP reconstructions were performed by the CT  technologist. Dose reduction techniques were used.     CONTRAST: 59mL Isovue-370    COMPARISON: None.    FINDINGS:  ANGIOGRAM CHEST: Positive study for bilateral pulmonary emboli, left  worse than right. Thoracic aorta is negative for dissection. Right  heart enlargement could indicate right heart strain.    LUNGS AND PLEURA: Trace dependent atelectasis. No effusions.    MEDIASTINUM/AXILLAE: No adenopathy or aneurysm.    CORONARY ARTERY CALCIFICATIONS: None.    UPPER ABDOMEN: Marked hepatic steatosis.    MUSCULOSKELETAL: No frankly destructive bony lesions.      Impression    IMPRESSION:  1.  Positive study for pulmonary embolism.  2.  Marked hepatic steatosis.    [Critical Result: Pulmonary embolism]    Finding was identified on 6/26/2023 3:44 PM.     Farhana Grant PA-C was contacted by me at 6/26/2023 3:45 PM and  verbalized understanding of the critical finding.     LIANNA HOPE MD         SYSTEM ID:  [id number]   Basic metabolic panel (BMP)     Status: Abnormal   Result Value Ref Range    Sodium 138 136 - 145 mmol/L    Potassium 4.5 3.4 - 5.3 mmol/L    Chloride 99 98 - 107 mmol/L    Carbon Dioxide (CO2) 22 22 - 29 mmol/L    Anion Gap 17 (H) 7 - 15 mmol/L    Urea Nitrogen 12.6 8.0 - 23.0 mg/dL    Creatinine 0.81 0.67 - 1.17 mg/dL    Calcium 9.6 8.8 - 10.2 mg/dL    Glucose 106 (H) 70 - 99 mg/dL    GFR Estimate >90 >60 mL/min/1.73m2   Troponin T, High Sensitivity (now)     Status:  Abnormal   Result Value Ref Range    Troponin T, High Sensitivity 34 (H) <=22 ng/L   [place] Draw     Status: None    Narrative    The following orders were created for panel order [place] Draw.  Procedure                               Abnormality         Status                     ---------                               -----------         ------                     Extra Blue Top Tube[[id number]]                              Final result               Extra Red Top Tube[[id number]]                               Final result                 Please view results for these tests on the individual orders.   CBC with platelets and differential     Status: Abnormal   Result Value Ref Range    WBC Count 5.8 4.0 - 11.0 10e3/uL    RBC Count 4.66 4.40 - 5.90 10e6/uL    Hemoglobin 16.1 13.3 - 17.7 g/dL    Hematocrit 46.4 40.0 - 53.0 %     78 - 100 fL    MCH 34.5 (H) 26.5 - 33.0 pg    MCHC 34.7 31.5 - 36.5 g/dL    RDW 15.3 (H) 10.0 - 15.0 %    Platelet Count 127 (L) 150 - 450 10e3/uL    % Neutrophils 70 %    % Lymphocytes 17 %    % Monocytes 13 %    % Eosinophils 0 %    % Basophils 0 %    % Immature Granulocytes 0 %    NRBCs per 100 WBC 0 <1 /100    Absolute Neutrophils 4.1 1.6 - 8.3 10e3/uL    Absolute Lymphocytes 1.0 0.8 - 5.3 10e3/uL    Absolute Monocytes 0.7 0.0 - 1.3 10e3/uL    Absolute Eosinophils 0.0 0.0 - 0.7 10e3/uL    Absolute Basophils 0.0 0.0 - 0.2 10e3/uL    Absolute Immature Granulocytes 0.0 <=0.4 10e3/uL    Absolute NRBCs 0.0 10e3/uL   Extra Blue Top Tube     Status: None   Result Value Ref Range    Hold Specimen JIC    Extra Red Top Tube     Status: None   Result Value Ref Range    Hold Specimen JI    D dimer quantitative     Status: Abnormal   Result Value Ref Range    D-Dimer Quantitative 9.13 (H) 0.00 - 0.50 ug/mL FEU    Narrative    This D-dimer assay is intended for use in conjunction with a clinical pretest probability assessment model to exclude pulmonary embolism (PE) and deep venous thrombosis  (DVT) in outpatients suspected of PE or DVT. The cut-off value is 0.50 ug/mL FEU.   EKG 12 lead     Status: None   Result Value Ref Range    Systolic Blood Pressure  mmHg    Diastolic Blood Pressure  mmHg    Ventricular Rate 100 BPM    Atrial Rate 100 BPM    CO Interval 136 ms    QRS Duration 102 ms     ms    QTc 492 ms    P Axis 55 degrees    R AXIS 29 degrees    T Axis 38 degrees    Interpretation ECG       Sinus rhythm  Prolonged QT  Abnormal ECG  No previous ECGs available  Confirmed by - EMERGENCY ROOM, PHYSICIAN ([id number]),  EDIE JONES ([place]) on 6/26/2023 1:54:50 PM     CBC + differential     Status: Abnormal    Narrative    The following orders were created for panel order CBC + differential.  Procedure                               Abnormality         Status                     ---------                               -----------         ------                     CBC with platelets and d...[[id number]]  Abnormal            Final result                 Please view results for these tests on the individual orders.     Mary Alice Proctor PA-C  [facility]  Securely message with the Vocera Web Console (learn more here)  Text page via [employer] Paging/Directory

## 2023-06-26 NOTE — TELEPHONE ENCOUNTER
Called and spoke with pt. Relayed Dr Amos's message from below. Pt agrees and is going to go to [place].

## 2023-06-26 NOTE — TELEPHONE ENCOUNTER
"Nurse Triage SBAR    Is this a 2nd Level Triage? YES, LICENSED PRACTITIONER REVIEW IS REQUIRED    Situation:     Patient calling reporting episode of chest pain/dizziness that occurred on Saturday 6/24/23    Background:     Episode of chest pain lasted for about 20 minutes - has now resolved     Since this episode has noticed mild SOB, fatigue, and slightly elevated heart rate     Assessment:     Breathing Difficulty:    RESPIRATORY STATUS: \"a little shortness of breath\"   ONSET: Saturday   PATTERN: only with activity, no SOB at rest   SEVERITY: mild   RECURRENT SYMPTOM: denies   CARDIAC HISTORY: denies hx of heart attack, angina, bypass surgery, angioplasty, arrhythmia  LUNG HISTORY: denies hx of pulmonary embolus, asthma, emphysema  OTHER SYMPTOMS: denies dizziness, runny nose, cough, chest pain, fever  O2 SATURATION MONITOR: does not have     Denies any difficulty breathing currently - only notices with activity     Does report hx of DVT in calf     Had single episode of chest pain - now gone     Also reporting \"my heart rate seems a little higher\"    Heart Rate:     DESCRIPTION: did not feel any palpitations but noticed apple watch showing higher heart rates on Saturday in the 140s - now sitting between 90s and 100s   ONSET: Saturday   DURATION: maybe an hour or so   PATTERN: intermittent  HEART RATE: currently 91, normal resting heart rate 80s  RECURRENT SYMPTOM: denies hx of any arrhthymias etc   CAUSE: denies   CARDIAC HISTORY: denies hx of heart attack, angina, bypass surgery, angioplasty, arrhythmia  OTHER SYMPTOMS: denies dizziness, chest pain, sweating    Patient also reports feeling fatigued since episode on the 24th     Protocol Recommended Disposition:   Go To ED/UCC Now (Or To Office With PCP Approval), Go to ED Now    Recommendation:     Triaged per Epic protocol, protocol for patient to go to ED now. Writer advised that patient go to nearest ER. Patient agreeable to go to ER but wants to check with " "PCP first, requesting for message to be sent to Dr. Cassidy to please advise.    Patient is concerned about how long it may take for PCP to review message, patient states if he has not heard back from PCP in less than an hour or two will head to [place] ED. IF any symptoms worsen, patient is agreeable to go to ED immediately.    Routing to PCP to please review and advise - thank you!     Routed to provider    Does the patient meet one of the following criteria for ADS visit consideration? 16+ years old, with an [facility] PCP     TIP  Providers, please consider if this condition is appropriate for management at one of our Acute and Diagnostic Services sites.     If patient is a good candidate, please use dotphrase <dot>triageresponse and select Refer to ADS to document.    Callback [phone] - ok to leave detailed VM     Gris Hickey RN  [facility]    Reason for Disposition    Any history of prior \"blood clot\" in leg or lungs    New or worsened shortness of breath with activity (dyspnea on exertion)    Additional Information    Negative: SEVERE difficulty breathing (e.g., struggling for each breath, speaks in single words, pulse > 120)    Negative: Breathing stopped and hasn't returned    Negative: Choking on something    Negative: Bluish (or gray) lips or face    Negative: Difficult to awaken or acting confused (e.g., disoriented, slurred speech)    Negative: Passed out (i.e., fainted, collapsed and was not responding)    Negative: Wheezing started suddenly after medicine, an allergic food, or bee sting    Negative: Stridor    Negative: Slow, shallow and weak breathing    Negative: Sounds like a life-threatening emergency to the triager    Negative: Chest pain    Negative: Wheezing (high pitched whistling sound) and previous asthma attacks or use of asthma medicines    Negative: Difficulty breathing and within 14 days of COVID-19 Exposure    Negative: Difficulty breathing and only present " when coughing    Negative: Difficulty breathing and only from stuffy nose    Negative: Difficulty breathing and only from stuffy nose or runny nose from common cold    Negative: MODERATE difficulty breathing (e.g., speaks in phrases, SOB even at rest, pulse 100-120) of new-onset or worse than normal    Negative: Oxygen level (e.g., pulse oximetry) 90 percent or lower    Negative: Wheezing can be heard across the room    Negative: Drooling or spitting out saliva (because can't swallow)    Negative: Passed out (i.e., lost consciousness, collapsed and was not responding)    Negative: Shock suspected (e.g., cold/pale/clammy skin, too weak to stand, low BP, rapid pulse)    Negative: Difficult to awaken or acting confused (e.g., disoriented, slurred speech)    Negative: Visible sweat on face or sweat dripping down face    Negative: Unable to walk, or can only walk with assistance (e.g., requires support)    Negative: Received SHOCK from implantable cardiac defibrillator and has persisting symptoms (i.e., palpitations, lightheadedness)    Negative: Dizziness, lightheadedness, or weakness and heart beating very rapidly (e.g., > 140 / minute)    Negative: Dizziness, lightheadedness, or weakness and heart beating very slowly (e.g., < 50 / minute)    Negative: Sounds like a life-threatening emergency to the triager    Negative: Chest pain    Negative: Difficulty breathing    Negative: Dizziness, lightheadedness, or weakness    Negative: Heart beating very rapidly (e.g., > 140 / minute) and present now  (Exception: During exercise.)    Negative: Heart beating very slowly (e.g., < 50 / minute)  (Exception: Athlete and heart rate normal for caller.)    Protocols used: BREATHING DIFFICULTY-A-OH, HEART RATE AND HEARTBEAT [id number]

## 2023-06-27 ENCOUNTER — PATIENT OUTREACH (OUTPATIENT)
Dept: ONCOLOGY | Facility: CLINIC | Age: 63
End: 2023-06-27
Payer: COMMERCIAL

## 2023-06-27 ENCOUNTER — APPOINTMENT (OUTPATIENT)
Dept: CARDIOLOGY | Facility: CLINIC | Age: 63
End: 2023-06-27
Attending: PHYSICIAN ASSISTANT
Payer: COMMERCIAL

## 2023-06-27 VITALS
WEIGHT: 165 LBS | DIASTOLIC BLOOD PRESSURE: 89 MMHG | RESPIRATION RATE: 18 BRPM | TEMPERATURE: 98 F | OXYGEN SATURATION: 98 % | SYSTOLIC BLOOD PRESSURE: 156 MMHG | HEART RATE: 91 BPM | BODY MASS INDEX: 23.68 KG/M2

## 2023-06-27 LAB
ANION GAP SERPL CALCULATED.3IONS-SCNC: 12 MMOL/L (ref 7–15)
BASOPHILS # BLD AUTO: 0 10E3/UL (ref 0–0.2)
BASOPHILS NFR BLD AUTO: 1 %
BUN SERPL-MCNC: 15.1 MG/DL (ref 8–23)
CALCIUM SERPL-MCNC: 9 MG/DL (ref 8.8–10.2)
CHLORIDE SERPL-SCNC: 104 MMOL/L (ref 98–107)
CREAT SERPL-MCNC: 0.81 MG/DL (ref 0.67–1.17)
DEPRECATED HCO3 PLAS-SCNC: 24 MMOL/L (ref 22–29)
EOSINOPHIL # BLD AUTO: 0.1 10E3/UL (ref 0–0.7)
EOSINOPHIL NFR BLD AUTO: 2 %
ERYTHROCYTE [DISTWIDTH] IN BLOOD BY AUTOMATED COUNT: 15.5 % (ref 10–15)
GFR SERPL CREATININE-BSD FRML MDRD: >90 ML/MIN/1.73M2
GLUCOSE SERPL-MCNC: 95 MG/DL (ref 70–99)
HCT VFR BLD AUTO: 40.9 % (ref 40–53)
HGB BLD-MCNC: 14 G/DL (ref 13.3–17.7)
IMM GRANULOCYTES # BLD: 0 10E3/UL
IMM GRANULOCYTES NFR BLD: 1 %
LVEF ECHO: NORMAL
LYMPHOCYTES # BLD AUTO: 1.1 10E3/UL (ref 0.8–5.3)
LYMPHOCYTES NFR BLD AUTO: 26 %
MCH RBC QN AUTO: 34.4 PG (ref 26.5–33)
MCHC RBC AUTO-ENTMCNC: 34.2 G/DL (ref 31.5–36.5)
MCV RBC AUTO: 101 FL (ref 78–100)
MONOCYTES # BLD AUTO: 0.5 10E3/UL (ref 0–1.3)
MONOCYTES NFR BLD AUTO: 12 %
NEUTROPHILS # BLD AUTO: 2.5 10E3/UL (ref 1.6–8.3)
NEUTROPHILS NFR BLD AUTO: 58 %
NRBC # BLD AUTO: 0 10E3/UL
NRBC BLD AUTO-RTO: 0 /100
PLATELET # BLD AUTO: 92 10E3/UL (ref 150–450)
POTASSIUM SERPL-SCNC: 4.4 MMOL/L (ref 3.4–5.3)
RBC # BLD AUTO: 4.07 10E6/UL (ref 4.4–5.9)
SODIUM SERPL-SCNC: 140 MMOL/L (ref 136–145)
UFH PPP CHRO-ACNC: 0.52 IU/ML
UFH PPP CHRO-ACNC: 0.58 IU/ML
WBC # BLD AUTO: 4.2 10E3/UL (ref 4–11)

## 2023-06-27 PROCEDURE — G0378 HOSPITAL OBSERVATION PER HR: HCPCS

## 2023-06-27 PROCEDURE — 85025 COMPLETE CBC W/AUTO DIFF WBC: CPT | Performed by: PHYSICIAN ASSISTANT

## 2023-06-27 PROCEDURE — 36415 COLL VENOUS BLD VENIPUNCTURE: CPT | Performed by: PHYSICIAN ASSISTANT

## 2023-06-27 PROCEDURE — 93306 TTE W/DOPPLER COMPLETE: CPT | Mod: 26 | Performed by: INTERNAL MEDICINE

## 2023-06-27 PROCEDURE — 99239 HOSP IP/OBS DSCHRG MGMT >30: CPT | Performed by: HOSPITALIST

## 2023-06-27 PROCEDURE — 999N000208 ECHOCARDIOGRAM COMPLETE

## 2023-06-27 PROCEDURE — 250N000013 HC RX MED GY IP 250 OP 250 PS 637: Performed by: HOSPITALIST

## 2023-06-27 PROCEDURE — 80048 BASIC METABOLIC PNL TOTAL CA: CPT | Performed by: PHYSICIAN ASSISTANT

## 2023-06-27 PROCEDURE — 85520 HEPARIN ASSAY: CPT | Performed by: PHYSICIAN ASSISTANT

## 2023-06-27 PROCEDURE — 96366 THER/PROPH/DIAG IV INF ADDON: CPT

## 2023-06-27 PROCEDURE — 85520 HEPARIN ASSAY: CPT | Mod: 91 | Performed by: PHYSICIAN ASSISTANT

## 2023-06-27 PROCEDURE — 250N000011 HC RX IP 250 OP 636: Mod: JZ | Performed by: PHYSICIAN ASSISTANT

## 2023-06-27 PROCEDURE — 255N000002 HC RX 255 OP 636: Performed by: HOSPITALIST

## 2023-06-27 RX ADMIN — HUMAN ALBUMIN MICROSPHERES AND PERFLUTREN 3 ML: 10; .22 INJECTION, SOLUTION INTRAVENOUS at 10:41

## 2023-06-27 RX ADMIN — HEPARIN SODIUM AND DEXTROSE 1350 UNITS/HR: 10000; 5 INJECTION INTRAVENOUS at 06:15

## 2023-06-27 RX ADMIN — APIXABAN 10 MG: 5 TABLET, FILM COATED ORAL at 09:08

## 2023-06-27 ASSESSMENT — ACTIVITIES OF DAILY LIVING (ADL)
ADLS_ACUITY_SCORE: 18

## 2023-06-27 NOTE — PHARMACY - DISCHARGE MEDICATION RECONCILIATION AND EDUCATION
Kasi Krause     1960      male    [id number]                                [id number]    Allergy: Patient has no known allergies.    RX: Discharge Medication Consult by Pharmacist  EDUCATION:   Discharge Medication List     Medication List      Started    * apixaban ANTICOAGULANT 5 MG tablet  Commonly known as: ELIQUIS  10 mg, Oral, 2 TIMES DAILY     * apixaban ANTICOAGULANT 5 MG tablet  Commonly known as: ELIQUIS  5 mg, Oral, 2 TIMES DAILY  Start taking on: July 4, 2023         * This list has 2 medication(s) that are the same as other medications prescribed for you. Read the directions carefully, and ask your doctor or other care provider to review them with you.                Patient was informed to STOP taking the following HOME medications:   none    Patient was informed to start taking the following NEW medications:   apixaban    Patient was educated on the following for each discharge medication:  Rationale for therapy  Duration of treatment  Dosing and or monitoring drug levels  Common side effects  Importance of compliance  Drug/food interactions  Missed doses  Self monitoring parameters    OUTCOMES:  Patient verbalized understanding  Left written materials and instructions: A Guide to Newer Anticoagulants.  IMPORTANT FOLLOW UP NOTES:   Follow up with primary care provider, Kasi Cassidy, within 7 days for hospital follow- up.  The following labs/tests are recommended: cbc to check platelets.

## 2023-06-27 NOTE — PLAN OF CARE
PRIMARY DIAGNOSIS:Pulmonary embolism   OUTPATIENT/OBSERVATION GOALS TO BE MET BEFORE DISCHARGE:  1. ADLs back to baseline: Yes    2. Activity and level of assistance: Ambulating independently.    3. Pain status: Pain free.    4. Return to near baseline physical activity: Yes     Discharge Planner Nurse   Safe discharge environment identified: Yes  Barriers to discharge: Yes       Entered by: Emily Arias RN 06/27/2023 12:20 AM     Please review provider order for any additional goals.   Nurse to notify provider when observation goals have been met and patient is ready for discharge.    Pt A&Ox4, denies pain,SOB. C/o MARSHALL, Ambulated to bathroom, reported increase of loose stools, Hep infusing 1350 units/hr,    BP (!) 134/96 (BP Location: Right arm)   Pulse 82   Temp 98  F (36.7  C) (Oral)   Resp 18   Wt 74.8 kg (165 lb)   SpO2 95%   BMI 23.68 kg/m

## 2023-06-27 NOTE — CARE PLAN
PRIMARY DIAGNOSIS: Acute pulmonary embolism   OUTPATIENT/OBSERVATION GOALS TO BE MET BEFORE DISCHARGE:  1. ADLs back to baseline: Yes    2. Activity and level of assistance: Up with standby assistance.    3. Pain status: Pain free.    4. Return to near baseline physical activity: Yes     Discharge Planner Nurse   Safe discharge environment identified: Yes  Barriers to discharge: Yes       Entered by: Aneesh Hernandez RN 06/26/2023      Please review provider order for any additional goals.   Nurse to notify provider when observation goals have been met and patient is ready for discharge.        Pt alert and oriented x 4 .VSS on RA.denies pain or chest pain.PIV SL.Heprin drip 1350 unit/hr.Per Tele reading NSR 70 s.Regular diet tolerated well.    BP (!) 134/96 (BP Location: Right arm)   Pulse 82   Temp 98  F (36.7  C) (Oral)   Resp 18   Wt 74.8 kg (165 lb)   SpO2 95%   BMI 23.68 kg/m

## 2023-06-27 NOTE — PROGRESS NOTES
Referral received for benign heme services, see below.    Referral reason: PE; thrombocytopenia, previously established with Dr Gonzalez at [place] but has since timed out    Current abnormal labs: Available in Chart Review    Outreach: Call not placed to patient regarding referral.    Plan: Triage instructions updated and sent to NPS for completion.

## 2023-06-27 NOTE — PLAN OF CARE
PRIMARY DIAGNOSIS:Pulmonary embolism   OUTPATIENT/OBSERVATION GOALS TO BE MET BEFORE DISCHARGE:  ADLs back to baseline: Yes     Activity and level of assistance: Ambulating independently.     Pain status: Pain free.     Return to near baseline physical activity: Yes          Discharge Planner Nurse   Safe discharge environment identified: Yes  Barriers to discharge: Not once medically cleared       Entered by: Ema Millard RN    BP (!) 143/104   Pulse 74   Temp 98  F (36.7  C) (Oral)   Resp 18   Wt 74.8 kg (165 lb)   SpO2 97%   BMI 23.68 kg/m        Vitals stable, except BP elevated-pt reports he did not receive his BP med last night. Pt alert and oriented x4. Independently moving. Denies pain/SOB/dizziness. Heparin infusing. Plan for Eliquis administration and heparin infusion discontinuation this morning. Tolerating regular diet. Plan for echo this morning and possible discharge this afternoon. Will continue to provide supportive cares.

## 2023-06-27 NOTE — DISCHARGE SUMMARY
[facility]  Hospitalist Discharge Summary      Date of Admission:  6/26/2023  Date of Discharge:  6/27/2023  Discharging Provider: Josue Villarreal MD  Discharge Service: Hospitalist Service    Discharge Diagnoses   Acute pulmonary embolism    Clinically Significant Risk Factors          Follow-ups Needed After Discharge   Follow-up Appointments     Follow-up and recommended labs and tests       Follow up with primary care provider, Kasi Cassidy, within 7 days for   hospital follow- up.  The following labs/tests are recommended: cbc to   check platelets.  Follow-up with Hematology. Referral placed.          Unresulted Labs Ordered in the Past 30 Days of this Admission     No orders found for last 31 day(s).      These results will be followed up by NA    Discharge Disposition   Discharged to home  Condition at discharge: Stable    Hospital Course    Kasi Krause is a 62 year old male who presents with shortness of breath, fatigue, and tachycardia.  Patient states that he had onset of central nonradiating chest pain on 6/24 while driving followed by dizziness, shortness of breath, elevated heart rate, and fatigue.  Chest pain and dizziness resolved after approximately 20 minutes but he has had persistent fatigue, shortness of breath, and elevated heart rate since then.  He states he is extremely winded with just going up a flight of stairs.  He notes maybe mild intermittent episodes of lightheadedness on 6/25.  He notes mild nausea without vomiting recently.  Denies recent fever, chills, abdominal pain, or lower extremity swelling.  The patient recently traveled to [facility] and [place] from 5/11 to 5/25.  He reports the flight there was approximately 9 hours in duration and on the way back it was approximately 7.5 hours.  Denies any other long distance travel or car rides. No recent surgery or immobility. Patient does have a history of what was thought to be a provoked DVT in 2015 where he  was on warfarin for 3 months and was seen by hematology at that time with unremarkable hypercoagulable work-up.  Not currently on anticoagulation.  Denies any history of CAD.  Patient believes both his parents have a history of blood clots.    I assumed care of patient today.  He is doing well.  He has no pain.  He is ready for discharge home.  His wife is in the room.  I will fill his Eliquis for him.  His echocardiogram is still pending.  I did call down to the reading room but did not get an answer.  I doubt that there is anything in his echocardiogram that would keep him here as he is perfectly stable.  He will discharge home.  I indicated he should avoid alcohol given his platelets are low.  Not indicated that his bleeding risk is higher.  He will follow with with his primary care doctor and I have also placed a referral to hematology.  Consultations This Hospital Stay   PHARMACY IP CONSULT  PHARMACY IP CONSULT  PHARMACY LIAISON FOR MEDICATION COVERAGE CONSULT  PHARMACY DISCHARGE EDUCATION BY PHARMACIST    Code Status   Full Code    Time Spent on this Encounter   I, Josue Villarreal MD, personally saw the patient today and spent greater than 30 minutes discharging this patient.       Josue Villarreal MD  [facility] OBSERVATION DEPT  [address] [place] [address]  Phone: [phone]  ______________________________________________________________________    Physical Exam   Vital Signs: Temp: 98  F (36.7  C) Temp src: Oral BP: (!) 156/89 Pulse: 91   Resp: 18 SpO2: 98 % O2 Device: None (Room air)    Weight: 165 lbs 0 oz  Constitutional: awake, alert, cooperative, no apparent distress, and appears stated age  Eyes: Lids and lashes normal, pupils equal, round and reactive to light, extra ocular muscles intact, sclera clear, conjunctiva normal  ENT: Normocephalic, without obvious abnormality, atraumatic, sinuses nontender on palpation, external ears without lesions, oral pharynx with  moist mucous membranes, tonsils without erythema or exudates, gums normal and good dentition.  Respiratory: No increased work of breathing, good air exchange, clear to auscultation bilaterally, no crackles or wheezing  Cardiovascular: Normal apical impulse, regular rate and rhythm, normal S1 and S2, no S3 or S4, and no murmur noted  GI: No scars, normal bowel sounds, soft, non-distended, non-tender, no masses palpated, no hepatosplenomegally       Primary Care Physician   Kasi Cassidy    Discharge Orders      Adult Oncology/Hematology  Referral      Reason for your hospital stay    Acute pulmonary embolism     Follow-up and recommended labs and tests     Follow up with primary care provider, Kasi Cassidy, within 7 days for hospital follow- up.  The following labs/tests are recommended: cbc to check platelets.  Follow-up with Hematology. Referral placed.     Activity    Your activity upon discharge: activity as tolerated     Diet    Follow this diet upon discharge: Orders Placed This Encounter      Regular Diet Adult       Significant Results and Procedures   Most Recent 3 CBC's:Recent Labs   Lab Test 06/27/23  0500 06/26/23  1318 09/13/22  0848   WBC 4.2 5.8 2.9*   HGB 14.0 16.1 15.3   * 100 100   PLT 92* 127* 155     Most Recent 3 BMP's:Recent Labs   Lab Test 06/27/23  0500 06/26/23  1318 09/13/22  0848    138 139   POTASSIUM 4.4 4.5 3.7   CHLORIDE 104 99 105   CO2 24 22 24   BUN 15.1 12.6 10   CR 0.81 0.81 0.60*   ANIONGAP 12 17* 10   TATO 9.0 9.6 8.8   GLC 95 106* 85     Most Recent 2 LFT's:Recent Labs   Lab Test 09/13/22  0848 06/04/19  0851   AST 18 15   ALT 32 32   ALKPHOS 62 59   BILITOTAL 1.3 1.1   ,   Results for orders placed or performed during the hospital encounter of 06/26/23   CT Chest Pulmonary Embolism w Contrast     Value    Radiologist flags Pulmonary embolism (AA)    Narrative    CT CHEST PULMONARY EMBOLISM WITH CONTRAST 6/26/2023 3:41 PM    CLINICAL HISTORY: Chest  Pain. Tachycardia. History of deep venous  thrombosis, recent travel, elevated D-dimer, question pulmonary  embolism.     TECHNIQUE: CT angiogram chest during arterial phase injection IV  contrast. 2D and 3D MIP reconstructions were performed by the CT  technologist. Dose reduction techniques were used.     CONTRAST: 59mL Isovue-370    COMPARISON: None.    FINDINGS:  ANGIOGRAM CHEST: Positive study for bilateral pulmonary emboli, left  worse than right. Thoracic aorta is negative for dissection. Right  heart enlargement could indicate right heart strain.    LUNGS AND PLEURA: Trace dependent atelectasis. No effusions.    MEDIASTINUM/AXILLAE: No adenopathy or aneurysm.    CORONARY ARTERY CALCIFICATIONS: None.    UPPER ABDOMEN: Marked hepatic steatosis.    MUSCULOSKELETAL: No frankly destructive bony lesions.      Impression    IMPRESSION:  1.  Positive study for pulmonary embolism.  2.  Marked hepatic steatosis.    [Critical Result: Pulmonary embolism]    Finding was identified on 6/26/2023 3:44 PM.     Farhana Grant PA-C was contacted by me at 6/26/2023 3:45 PM and  verbalized understanding of the critical finding.     LIANNA HOPE MD         SYSTEM ID:  [id number]       Discharge Medications   Current Discharge Medication List      START taking these medications    Details   !! apixaban ANTICOAGULANT (ELIQUIS) 5 MG tablet Take 2 tablets (10 mg) by mouth 2 times daily for 13 doses  Qty: 26 tablet, Refills: 0    Associated Diagnoses: Other acute pulmonary embolism with acute cor pulmonale (H)      !! apixaban ANTICOAGULANT (ELIQUIS) 5 MG tablet Take 1 tablet (5 mg) by mouth 2 times daily  Qty: 90 tablet, Refills: 1    Associated Diagnoses: Other acute pulmonary embolism with acute cor pulmonale (H)       !! - Potential duplicate medications found. Please discuss with provider.      CONTINUE these medications which have NOT CHANGED    Details   Ascorbic Acid (VITAMIN C PO) Take 500 mg by mouth daily      cetirizine  (ZYRTEC) 10 MG tablet Take 10 mg by mouth daily      lisinopril (ZESTRIL) 20 MG tablet Take 1 tablet (20 mg) by mouth daily  Qty: 90 tablet, Refills: 3    Associated Diagnoses: Essential hypertension, benign      VITAMIN D, CHOLECALCIFEROL, PO Take 5,000 Units by mouth daily      !! ORDER FOR DME Equipment being ordered: compression stocking- one pair  Qty: 1 Device, Refills: 0    Associated Diagnoses: DVT (deep venous thrombosis), unspecified laterality      !! ORDER FOR DME Compression stockings-1 pair with 30-40MM pressure  Qty: 1 Application, Refills: 1    Associated Diagnoses: DVT (deep venous thrombosis), unspecified laterality       !! - Potential duplicate medications found. Please discuss with provider.        Allergies   No Known Allergies

## 2023-06-27 NOTE — PLAN OF CARE
Patient's After Visit Summary was reviewed with patient and wife  Patient verbalized understanding of After Visit Summary, recommended follow up and was given an opportunity to ask questions.   Discharge medications sent home with patient/family: YES  Discharged with WIFE    OBSERVATION patient END time: 1316

## 2023-06-27 NOTE — CONSULTS
Patient has [employer] through the marketplace with $3000 (of $3000) deductible remaining.    Note: Eliquis and use of its associated copay card make it much more affordable than Xarelto for patients with high deductibles such as this.  Details below.     Xarelto:  $518/mo. Upon receipt of RX Discharge Pharmacy can provide copay savings card to reduce this to $10 per fill (up to a 90 day supply) for the first 90 days, and then $318/mo thereafter.      Eliquis:  $536/mo. Upon receipt of RX Discharge Pharmacy can provide copay savings card to reduce this to $10 per month ongoing       Ankita Myrick  Pharmacy Technician/Liaison, Discharge Pharmacy   [phone] (voice or text)  [email or web address]

## 2023-06-27 NOTE — PLAN OF CARE
PRIMARY DIAGNOSIS:Pulmonary embolism   OUTPATIENT/OBSERVATION GOALS TO BE MET BEFORE DISCHARGE:  1. ADLs back to baseline: Yes    2. Activity and level of assistance: Ambulating independently.    3. Pain status: Pain free.    4. Return to near baseline physical activity: Yes     Discharge Planner Nurse   Safe discharge environment identified: Yes  Barriers to discharge: Yes       Entered by: Emily Arias RN 06/27/2023 03:22 AM     Please review provider order for any additional goals.   Nurse to notify provider when observation goals have been met and patient is ready for discharge.    Pt A&Ox4, denies pain,SOB. C/o MARSHALL, Ambulated to bathroom, IND, reported increase of loose stools, Hep infusing 1350 units/hr, Anti xa lvl 0.52, AM recheck, Troponin 28, Tele NSR, tolerating reg diet, appeared sleeping/eyes closed, rise and fall of chest observed, able to make needs known, use call light appropriately, safety checks completed    BP (!) 134/96 (BP Location: Right arm)   Pulse 82   Temp 98  F (36.7  C) (Oral)   Resp 18   Wt 74.8 kg (165 lb)   SpO2 95%   BMI 23.68 kg/m

## 2023-06-27 NOTE — CARE PLAN
ROOM # 208-1    Living Situation (if not independent, order SW consult):Home with family   Facility name:  : Spouse    Activity level at baseline: Ind  Activity level on admit: SBA    Who will be transporting you at discharge: Family    Patient registered to observation; given Patient Bill of Rights; given the opportunity to ask questions about observation status and their plan of care.  Patient has been oriented to the observation room, bathroom and call light is in place.    Discussed discharge goals and expectations with patient/family.

## 2023-06-28 ENCOUNTER — PATIENT OUTREACH (OUTPATIENT)
Dept: CARE COORDINATION | Facility: CLINIC | Age: 63
End: 2023-06-28
Payer: COMMERCIAL

## 2023-06-28 NOTE — PROGRESS NOTES
"Clinic Care Coordination Contact  [facility]: Post-Discharge Note  SITUATION                                                      Admission:    Admission Date: 06/26/23   Reason for Admission: Acute pulmonary embolism  Discharge:   Discharge Date: 06/27/23  Discharge Diagnosis: Acute pulmonary embolism    BACKGROUND                                                      Per hospital discharge summary and inpatient provider notes:    Kasi Krause is a 62 year old male who presents with shortness of breath, fatigue, and tachycardia.  Patient states that he had onset of central nonradiating chest pain on 6/24 while driving followed by dizziness, shortness of breath, elevated heart rate, and fatigue.  Chest pain and dizziness resolved after approximately 20 minutes but he has had persistent fatigue, shortness of breath, and elevated heart rate since then.  He states he is extremely winded with just going up a flight of stairs.  He notes maybe mild intermittent episodes of lightheadedness on 6/25.  He notes mild nausea without vomiting recently.  Denies recent fever, chills, abdominal pain, or lower extremity swelling.  The patient recently traveled to [facility] and [place] from 5/11 to 5/25.  He reports the flight there was approximately 9 hours in duration and on the way back it was approximately 7.5 hours.  Denies any other long distance travel or car rides. No recent surgery or immobility. Patient does have a history of what was thought to be a provoked DVT in 2015 where he was on warfarin for 3 months and was seen by hematology at that time with unremarkable hypercoagulable work-up.  Not currently on anticoagulation.  Denies any history of CAD.  Patient believes both his parents have a history of blood clots.    ASSESSMENT           Discharge Assessment  How are you doing now that you are home?: \"I'm doing good\"  How are your symptoms? (Red Flag symptoms escalate to triage hotline per guidelines): Improved  Do " you feel your condition is stable enough to be safe at home until your provider visit?: Yes  Does the patient have their discharge instructions? : Yes  Does the patient have questions regarding their discharge instructions? : No  Were you started on any new medications or were there changes to any of your previous medications? : Yes  Does the patient have all of their medications?: Yes                  PLAN                                                      Outpatient Plan:  Follow up with primary care provider, Kasi Cassidy, within 7 days for hospital follow- up.  The following labs/tests are recommended: cbc to check platelets.  Follow-up with Hematology. Referral placed.    No future appointments.      For any urgent concerns, please contact our 24 hour nurse triage line: [phone] ([phone])         Gabbie Cooper  Community Health Worker  Connected Care Resource [facility]  [phone]

## 2023-06-30 NOTE — TELEPHONE ENCOUNTER
RECORDS STATUS - ALL OTHER DIAGNOSIS      RECORDS RECEIVED FROM: Epic   DATE RECEIVED:    NOTES STATUS DETAILS   OFFICE NOTE from referring provider Epic Dr. Josue Villarreal   OFFICE NOTE from medical oncologist Epic 06/10/15: Dr. Jared Gonzalez   DISCHARGE SUMMARY from hospital [facility] 06/26/23: [facility]   MEDICATION LIST [facility]    LABS     PATHOLOGY REPORTS     ANYTHING RELATED TO DIAGNOSIS Epic Most recent 06/27/23   IMAGING (NEED IMAGES & REPORT)     CT SCANS PACS 06/26/23: CT Chest   XRAYS PACS 03/18/13-01/25/13: XR Chest

## 2023-07-06 ENCOUNTER — OFFICE VISIT (OUTPATIENT)
Dept: INTERNAL MEDICINE | Facility: CLINIC | Age: 63
End: 2023-07-06
Payer: COMMERCIAL

## 2023-07-06 VITALS
TEMPERATURE: 98.3 F | BODY MASS INDEX: 23.99 KG/M2 | RESPIRATION RATE: 15 BRPM | OXYGEN SATURATION: 98 % | HEIGHT: 70 IN | DIASTOLIC BLOOD PRESSURE: 84 MMHG | SYSTOLIC BLOOD PRESSURE: 134 MMHG | HEART RATE: 92 BPM | WEIGHT: 167.6 LBS

## 2023-07-06 DIAGNOSIS — Z09 HOSPITAL DISCHARGE FOLLOW-UP: Primary | ICD-10-CM

## 2023-07-06 DIAGNOSIS — I10 ESSENTIAL HYPERTENSION, BENIGN: ICD-10-CM

## 2023-07-06 DIAGNOSIS — I26.09 OTHER ACUTE PULMONARY EMBOLISM WITH ACUTE COR PULMONALE (H): ICD-10-CM

## 2023-07-06 DIAGNOSIS — D69.6 THROMBOCYTOPENIA (H): ICD-10-CM

## 2023-07-06 LAB
ERYTHROCYTE [DISTWIDTH] IN BLOOD BY AUTOMATED COUNT: 15 % (ref 10–15)
HCT VFR BLD AUTO: 43.7 % (ref 40–53)
HGB BLD-MCNC: 15.2 G/DL (ref 13.3–17.7)
MCH RBC QN AUTO: 34.4 PG (ref 26.5–33)
MCHC RBC AUTO-ENTMCNC: 34.8 G/DL (ref 31.5–36.5)
MCV RBC AUTO: 99 FL (ref 78–100)
PLATELET # BLD AUTO: 193 10E3/UL (ref 150–450)
RBC # BLD AUTO: 4.42 10E6/UL (ref 4.4–5.9)
WBC # BLD AUTO: 5.3 10E3/UL (ref 4–11)

## 2023-07-06 PROCEDURE — 99495 TRANSJ CARE MGMT MOD F2F 14D: CPT | Performed by: INTERNAL MEDICINE

## 2023-07-06 PROCEDURE — 85027 COMPLETE CBC AUTOMATED: CPT | Performed by: INTERNAL MEDICINE

## 2023-07-06 PROCEDURE — 36415 COLL VENOUS BLD VENIPUNCTURE: CPT | Performed by: INTERNAL MEDICINE

## 2023-07-06 NOTE — PROGRESS NOTES
"  Assessment & Plan     Hospital discharge follow-up  Post recent discharge.  Continuing with current medical management.  - CBC with platelets; Future      Other acute pulmonary embolism with acute cor pulmonale (H)  Currently on oral anticoagulation.  Tolerating as noted.  Following up with hematology and oncology for assessment upcoming.  Discussed potential benefit of need for ongoing anticoagulation due to prior DVT.  - CBC with platelets; Future      Essential hypertension, benign  Stable on therapy continuing with current medical management as noted.    Thrombocytopenia (H)  Recheck CBC and slightly low platelet count.       MED REC REQUIRED  Post Medication Reconciliation Status: discharge medications reconciled, continue medications without change  See Patient Instructions    Kasi Cassidy MD  [facility]    Ish Villaseñor is a 62 year old, presenting for the following health issues:  Hospital F/U    HPI     Patient is here today for follow-up after recent hospitalization.  He has numerous questions all of which were answered.  He denies any major complaints.  He still has some mild fatigue and a little bit of decreased exercise tolerance.  He otherwise is feeling well.  He has finished his Eliquis in bolus fashion and is not currently on maintenance therapy.  He has a follow-up appointment scheduled with hematology and oncology upcoming.        6/28/2023     2:42 PM   Post Discharge Outreach   Admission Date 6/26/2023   Reason for Admission Acute pulmonary embolism   Discharge Date 6/27/2023   Discharge Diagnosis Acute pulmonary embolism   How are you doing now that you are home? \"I'm doing good\"   How are your symptoms? (Red Flag symptoms escalate to triage hotline per guidelines) Improved   Do you feel your condition is stable enough to be safe at home until your provider visit? Yes   Does the patient have their discharge instructions?  Yes   Does the patient have " questions regarding their discharge instructions?  No   Were you started on any new medications or were there changes to any of your previous medications?  Yes   Does the patient have all of their medications? Yes   Do you have questions regarding any of your medications?  No   Do you have all of your needed medical supplies or equipment (DME)?  (i.e. oxygen tank, CPAP, cane, etc.) Yes   Discharge follow-up appointment scheduled within 14 calendar days?  No     Hospital Follow-up Visit:    Hospital/Nursing Home/IP Rehab Facility: [facility]  Date of Admission: 6/26/23  Date of Discharge: 6/27/23  Reason(s) for Admission: Acute pulmonary embolism     Was your hospitalization related to COVID-19? No   Problems taking medications regularly:  None  Medication changes since discharge: None  Problems adhering to non-medication therapy:  None    Summary of hospitalization:  [facility] discharge summary reviewed  Diagnostic Tests/Treatments reviewed.  Follow up needed: as noted  Other Healthcare Providers Involved in Patient s Care:         None  Update since discharge: stable.     Plan of care communicated with patient     Chart summary reviewed.  Patient was seen and evaluated after recent traveling.  He underwent a full evaluation was found to have a pulmonary embolism.  He was started on anticoagulation therapy he was advised to follow-up in the hematology clinic.    Recent basic metabolic panel was found to be normal.    Review of Systems   CONSTITUTIONAL: NEGATIVE for fever, chills, change in weight  EYES: NEGATIVE for vision changes or irritation  ENT/MOUTH: NEGATIVE for ear, mouth and throat problems  CV: NEGATIVE for chest pain, palpitations or peripheral edema  GI: NEGATIVE for nausea, abdominal pain, heartburn, or change in bowel habits  : NEGATIVE for frequency, dysuria, or hematuria  NEURO: NEGATIVE for weakness, dizziness or paresthesias  HEME: NEGATIVE for bleeding  "problems  PSYCHIATRIC: NEGATIVE for changes in mood or affect    Current Outpatient Medications   Medication     apixaban ANTICOAGULANT (ELIQUIS) 5 MG tablet     Ascorbic Acid (VITAMIN C PO)     cetirizine (ZYRTEC) 10 MG tablet     lisinopril (ZESTRIL) 20 MG tablet     ORDER FOR DME     ORDER FOR DME     VITAMIN D, CHOLECALCIFEROL, PO     No current facility-administered medications for this visit.           Objective    /84   Pulse 92   Temp 98.3  F (36.8  C) (Temporal)   Resp 15   Ht 1.778 m (5' 10\")   Wt 76 kg (167 lb 9.6 oz)   SpO2 98%   BMI 24.05 kg/m    Body mass index is 24.05 kg/m .     Physical Exam:    GENERAL: alert and no distress  EYES: Eyes grossly normal to inspection, PERRL and conjunctivae and sclerae normal  HENT: ear canals and TM's normal, nose and mouth without ulcers or lesions  RESP: lungs clear to auscultation - no rales, rhonchi or wheezes  CV: regular rate and rhythm, normal S1 S2, no S3 or S4, no click or rub  ABDOMEN: soft, nontender, no hepatosplenomegaly, no masses and bowel sounds normal  NEURO: No focal changes  PSYCH: mentation appears normal, affect normal/bright              "

## 2023-07-31 ENCOUNTER — TELEPHONE (OUTPATIENT)
Dept: INTERNAL MEDICINE | Facility: CLINIC | Age: 63
End: 2023-07-31
Payer: COMMERCIAL

## 2023-07-31 DIAGNOSIS — I26.09 OTHER ACUTE PULMONARY EMBOLISM WITH ACUTE COR PULMONALE (H): Primary | ICD-10-CM

## 2023-07-31 NOTE — TELEPHONE ENCOUNTER
Spoke with patient to gather more information on Pulmonology Referral request. Patient did not endorse any new or worsening symptoms but stated he is requesting referral due to hx of pleurisy, pneumonia, and more recently multiple PE s in lungs. Patient is seeking PCP recommendations if pulmonology would be appropriate at this time.     Routing to PCP for review.

## 2023-07-31 NOTE — TELEPHONE ENCOUNTER
Patient called in, requesting a pulmonology referral and also requests Dr. Cassidy's personal recommendation on which specific pulmonologist he thinks the patient should see. Patient says he has had pleurisy and other lung issues in the past. He requests a phone call to his mobile phone when this is completed.

## 2023-07-31 NOTE — TELEPHONE ENCOUNTER
May inform patient that I have placed a pulmonary referral for him.  He should be receiving a phone call to schedule.  I do not have any specific recommendation for providers within the pulmonary clinic as is most of the providers is familiar to me have retired.  Would consider Dr. Gilles Nesbitt if still available Dr. Mary Alice Martini.

## 2023-07-31 NOTE — TELEPHONE ENCOUNTER
Spoke with patient to relay referral placement. Patient verbalized understanding and did not have any additional questions or concerns at this time.

## 2023-08-02 DIAGNOSIS — I26.09 OTHER ACUTE PULMONARY EMBOLISM WITH ACUTE COR PULMONALE (H): Primary | ICD-10-CM

## 2023-08-02 DIAGNOSIS — I27.81 COR PULMONALE (H): ICD-10-CM

## 2023-08-14 ENCOUNTER — PATIENT OUTREACH (OUTPATIENT)
Dept: CARE COORDINATION | Facility: CLINIC | Age: 63
End: 2023-08-14
Payer: COMMERCIAL

## 2023-08-28 ENCOUNTER — PATIENT OUTREACH (OUTPATIENT)
Dept: CARE COORDINATION | Facility: CLINIC | Age: 63
End: 2023-08-28
Payer: COMMERCIAL

## 2023-08-29 ENCOUNTER — PRE VISIT (OUTPATIENT)
Dept: ONCOLOGY | Facility: CLINIC | Age: 63
End: 2023-08-29
Payer: COMMERCIAL

## 2023-08-29 ENCOUNTER — ONCOLOGY VISIT (OUTPATIENT)
Dept: ONCOLOGY | Facility: CLINIC | Age: 63
End: 2023-08-29
Attending: HOSPITALIST
Payer: COMMERCIAL

## 2023-08-29 VITALS
HEIGHT: 70 IN | RESPIRATION RATE: 16 BRPM | OXYGEN SATURATION: 98 % | HEART RATE: 96 BPM | DIASTOLIC BLOOD PRESSURE: 93 MMHG | BODY MASS INDEX: 23.56 KG/M2 | SYSTOLIC BLOOD PRESSURE: 164 MMHG | WEIGHT: 164.6 LBS

## 2023-08-29 DIAGNOSIS — R10.84 ABDOMINAL PAIN, GENERALIZED: ICD-10-CM

## 2023-08-29 DIAGNOSIS — I82.4Y2 DEEP VEIN THROMBOSIS (DVT) OF PROXIMAL VEIN OF LEFT LOWER EXTREMITY, UNSPECIFIED CHRONICITY (H): ICD-10-CM

## 2023-08-29 DIAGNOSIS — I26.09 OTHER ACUTE PULMONARY EMBOLISM WITH ACUTE COR PULMONALE (H): Primary | ICD-10-CM

## 2023-08-29 PROCEDURE — 99205 OFFICE O/P NEW HI 60 MIN: CPT | Performed by: INTERNAL MEDICINE

## 2023-08-29 PROCEDURE — G0463 HOSPITAL OUTPT CLINIC VISIT: HCPCS | Performed by: INTERNAL MEDICINE

## 2023-08-29 ASSESSMENT — PAIN SCALES - GENERAL: PAINLEVEL: NO PAIN (0)

## 2023-08-29 NOTE — PROGRESS NOTES
"Oncology Rooming Note    August 29, 2023 10:07 AM   Nish Krause is a 62 year old male who presents for:    Chief Complaint   Patient presents with    Oncology Clinic Visit     Initial Vitals: Resp 16   Ht 1.778 m (5' 10\")   Wt 74.7 kg (164 lb 9.6 oz)   BMI 23.62 kg/m   Estimated body mass index is 23.62 kg/m  as calculated from the following:    Height as of this encounter: 1.778 m (5' 10\").    Weight as of this encounter: 74.7 kg (164 lb 9.6 oz). Body surface area is 1.92 meters squared.  No Pain (0) Comment: Data Unavailable   No LMP for male patient.  Allergies reviewed: Yes  Medications reviewed: Yes    Medications: Medication refills not needed today.  Pharmacy name entered into [facility]:    [id number] - [place], [place] - [address] AT [facility] [address] [facility]  [facility]IV[facility] - [place], [place] - [address]  (INACTIVE [facility] [id number]) [facility] - [place], [place] - [address]  [facility] - [facility] [address]        Ibeth Nichols MA            "

## 2023-08-29 NOTE — LETTER
"    8/29/2023         RE: Nish Krause  [address] [facility] [id number]        Dear Colleague,    Thank you for referring your patient, Nish Krause, to the [facility]. Please see a copy of my visit note below.    Oncology Rooming Note    August 29, 2023 10:07 AM   Nish Krause is a 62 year old male who presents for:    Chief Complaint   Patient presents with     Oncology Clinic Visit     Initial Vitals: Resp 16   Ht 1.778 m (5' 10\")   Wt 74.7 kg (164 lb 9.6 oz)   BMI 23.62 kg/m   Estimated body mass index is 23.62 kg/m  as calculated from the following:    Height as of this encounter: 1.778 m (5' 10\").    Weight as of this encounter: 74.7 kg (164 lb 9.6 oz). Body surface area is 1.92 meters squared.  No Pain (0) Comment: Data Unavailable   No LMP for male patient.  Allergies reviewed: Yes  Medications reviewed: Yes    Medications: Medication refills not needed today.  Pharmacy name entered into [employer]:    [facility][id number] - [place], [place] - [address] AT [facility] OF [facility] [address]  [facility] - [place], [place] - [address] [facility]  (INACTIVE SEE [facility] [id number]) [facility] - [place], [place] - [address]  [facility] - [place], [place] - [address]        [place], [place]              This consult has been requested by Dr. Josue Villarreal for pulmonary embolism.     Mr. Nish Krause is a gentleman with history of left lower extremity deep vein thrombosis.   1.  In 12/2014, patient had pain in the left Achilles tendon/leg. He was evaluated by Orthopedics and was in CAM boot starting beginning of january'15.   2.  Ultrasound of the left lower extremity on 01/19/2015 for pain and swelling revealed extensive occlusive acute/subacute deep vein thrombosis in the left lower extremity involving superficial femoral vein and extending into popliteal vein and posterior " tibial vein.   -Warfarin for 3 months for this provoked DVT.     3.  On 02/16/2015, negative for factor V Leiden mutation and prothrombin gene mutation.   4. On 06/03/2023:  -Normal protein S.  -Normal protein C.  -Normal anti-thrombin III.  -Lupus is negative.  -Normal cardiolipin antibody IgG and IgM.  5.  Ultrasound of the left lower extremity on 06/03/2015 revealed mild chronic nonocclusive residual deep vein thrombosis  within the left popliteal vein.       He has now been diagnosed with pulmonary embolism.  Patient went to [place] between 05/11/2023 and 05/25/2023.  Flights were about 9 hours each way.  Patient said that he did not have any problem.  Did not have any chest pain or shortness of breath.  No pain/swelling/redness in the extremities.    On 06/24/2023, patient was driving.  All of a sudden he has started to have pain mainly in the right side of the chest.  He also felt mildly short of breath.  On his Apple Watch, heart rate was around 140.  He also had mild dizziness.  He also had some cough.  He subsequently went to emergency room on 06/26/2023 and had multiple investigations done.  -Platelet low at 127.  Normal WBC and hemoglobin.  -Normal BMP.  -Elevated D-dimer.  -Elevated troponin.  -CT chest angiogram revealed bilateral pulmonary embolism, left worse than right.  There is hepatic steatosis.  -Echocardiogram revealed normal ejection fraction of 55-60%.  Right ventricle is mildly dilated.  Normal right ventricular systolic function.    Patient was admitted to hospital and was on heparin drip.  Now he is on Eliquis.  He is tolerating it well.  No bleeding complications.    Patient is still not back to normal.  No chest pain.  He still gets short of breath on exertion.  He still has some cough.  No hemoptysis.  He has an upcoming appointment with pulmonologist.    Discussed with him regarding provoking factors.  No strong provoking factor.  Patient has not had any surgery recently.  No trauma to  the leg.  No inactivity.  He was not bedridden.  He was not on any hormone medication that can cause thrombosis.  He did have a long duration flight to [place] and back a month before diagnosis of pulmonary embolism.  Unlikely that caused his thrombosis.    No headache.  Occasional lightheadedness.  No chest pain.  Gets intermittent abdominal pain.  It is mild and generalized.  No nausea or vomiting.  Appetite is good.  No urinary or bowel complaints.  No bleeding.  All other review of system is negative.      ALLERGIES:  None.      MEDICATIONS: Reviewed.    PAST MEDICAL HISTORY:  1.  Left leg DVT in . Provoked.   2.  HTN.     3.  Hyperlipidemia.  3.  Left Achilles tendon surgery and left 4th and 5th finger surgery.      SOCIAL HISTORY:    -No history of smoking.    -Occasional alcohol use.      FAMILY HISTORY:    -Mother had DVT.   -Sister  of ovarian cancer.      PHYSICAL EXAMINATION:   GENERAL:  The patient was alert and oriented x 3.   VITAL SIGNS:  Reviewed.   Rest of the system not examined.     LABORATORY DATA:  Reviewed. CBC on 2023 is normal.     ASSESSMENT:     1.  A 62-year-old gentleman with bilateral pulmonary embolism diagnosed on 2023.  This is unprovoked pulmonary embolism.  2.  Left lower extremity deep venous thrombosis diagnosed on 2015.  This was provoked from wearing a Cam boot for Achilles tendonitis.   3.  Other medical problem including hypertension and hyperlipidemia.     RECOMMENDATION:  1.  I had long discussion the patient regarding DVT and PE.    In , he was diagnosed with left lower extremity DVT.  It was provoked.  He was treated with 3 months of anticoagulation.  Follow-up ultrasound revealed mild chronic nonocclusive residual deep vein thrombosis within the left popliteal vein.  He has not had any more acute DVT.    End of , patient was found to have bilateral pulmonary embolism.  This is unprovoked pulmonary embolism.  Patient had flown to [place] and  back but it was a month before diagnosis of pulmonary embolism.  I do not think that caused his thrombosis.    Previously we have done hypercoagulable work-up.  No prothrombin gene or factor V Leiden mutation.  Normal protein C, protein S and Antithrombin III.  Lupus negative.    I discussed with him regarding possibility of occult malignancy causing thrombosis.  My clinical suspicion for any malignancy is very low.    2.  Patient is currently on Eliquis.  He is tolerating it well.  He will continue on it.  For this unprovoked pulmonary embolism, I would recommend indefinite anticoagulation.    3.  Patient has some chest related symptoms including shortness of breath on exertion and cough.  We will get CT chest.  He also has upcoming appointment with pulmonologist.    4.  Patient gets intermittent abdominal pain.  For further evaluation, we will get CT abdomen and pelvis.    5.  He had multiple questions which were all answered.  I will see him after the CT scan.    Thanks for the consult.    Total visit time of 60 minutes.  Time spent in today's visit, review of chart/investigations today and documentation today.      Again, thank you for allowing me to participate in the care of your patient.        Sincerely,        Jared Gonzalez MD

## 2023-09-04 NOTE — PROGRESS NOTES
This consult has been requested by Dr. Josue Villarreal for pulmonary embolism.     Mr. Kasi Krause is a gentleman with history of left lower extremity deep vein thrombosis.   1.  In 12/2014, patient had pain in the left Achilles tendon/leg. He was evaluated by Orthopedics and was in CAM boot starting beginning of january'15.   2.  Ultrasound of the left lower extremity on 01/19/2015 for pain and swelling revealed extensive occlusive acute/subacute deep vein thrombosis in the left lower extremity involving superficial femoral vein and extending into popliteal vein and posterior tibial vein.   -Warfarin for 3 months for this provoked DVT.     3.  On 02/16/2015, negative for factor V Leiden mutation and prothrombin gene mutation.   4. On 06/03/2023:  -Normal protein S.  -Normal protein C.  -Normal anti-thrombin III.  -Lupus is negative.  -Normal cardiolipin antibody IgG and IgM.  5.  Ultrasound of the left lower extremity on 06/03/2015 revealed mild chronic nonocclusive residual deep vein thrombosis  within the left popliteal vein.       He has now been diagnosed with pulmonary embolism.  Patient went to [place] between 05/11/2023 and 05/25/2023.  Flights were about 9 hours each way.  Patient said that he did not have any problem.  Did not have any chest pain or shortness of breath.  No pain/swelling/redness in the extremities.    On 06/24/2023, patient was driving.  All of a sudden he has started to have pain mainly in the right side of the chest.  He also felt mildly short of breath.  On his Apple Watch, heart rate was around 140.  He also had mild dizziness.  He also had some cough.  He subsequently went to emergency room on 06/26/2023 and had multiple investigations done.  -Platelet low at 127.  Normal WBC and hemoglobin.  -Normal BMP.  -Elevated D-dimer.  -Elevated troponin.  -CT chest angiogram revealed bilateral pulmonary embolism, left worse than right.  There is hepatic steatosis.  -Echocardiogram revealed normal  ejection fraction of 55-60%.  Right ventricle is mildly dilated.  Normal right ventricular systolic function.    Patient was admitted to hospital and was on heparin drip.  Now he is on Eliquis.  He is tolerating it well.  No bleeding complications.    Patient is still not back to normal.  No chest pain.  He still gets short of breath on exertion.  He still has some cough.  No hemoptysis.  He has an upcoming appointment with pulmonologist.    Discussed with him regarding provoking factors.  No strong provoking factor.  Patient has not had any surgery recently.  No trauma to the leg.  No inactivity.  He was not bedridden.  He was not on any hormone medication that can cause thrombosis.  He did have a long duration flight to [place] and back a month before diagnosis of pulmonary embolism.  Unlikely that caused his thrombosis.    No headache.  Occasional lightheadedness.  No chest pain.  Gets intermittent abdominal pain.  It is mild and generalized.  No nausea or vomiting.  Appetite is good.  No urinary or bowel complaints.  No bleeding.  All other review of system is negative.      ALLERGIES:  None.      MEDICATIONS: Reviewed.    PAST MEDICAL HISTORY:  1.  Left leg DVT in . Provoked.   2.  HTN.     3.  Hyperlipidemia.  3.  Left Achilles tendon surgery and left 4th and 5th finger surgery.      SOCIAL HISTORY:    -No history of smoking.    -Occasional alcohol use.      FAMILY HISTORY:    -Mother had DVT.   -Sister  of ovarian cancer.      PHYSICAL EXAMINATION:   GENERAL:  The patient was alert and oriented x 3.   VITAL SIGNS:  Reviewed.   Rest of the system not examined.     LABORATORY DATA:  Reviewed. CBC on 2023 is normal.     ASSESSMENT:     1.  A 62-year-old gentleman with bilateral pulmonary embolism diagnosed on 2023.  This is unprovoked pulmonary embolism.  2.  Left lower extremity deep venous thrombosis diagnosed on 2015.  This was provoked from wearing a Cam boot for Achilles tendonitis.    3.  Other medical problem including hypertension and hyperlipidemia.     RECOMMENDATION:  1.  I had long discussion the patient regarding DVT and PE.    In 2015, he was diagnosed with left lower extremity DVT.  It was provoked.  He was treated with 3 months of anticoagulation.  Follow-up ultrasound revealed mild chronic nonocclusive residual deep vein thrombosis within the left popliteal vein.  He has not had any more acute DVT.    End of June, patient was found to have bilateral pulmonary embolism.  This is unprovoked pulmonary embolism.  Patient had flown to [place] and back but it was a month before diagnosis of pulmonary embolism.  I do not think that caused his thrombosis.    Previously we have done hypercoagulable work-up.  No prothrombin gene or factor V Leiden mutation.  Normal protein C, protein S and Antithrombin III.  Lupus negative.    I discussed with him regarding possibility of occult malignancy causing thrombosis.  My clinical suspicion for any malignancy is very low.    2.  Patient is currently on Eliquis.  He is tolerating it well.  He will continue on it.  For this unprovoked pulmonary embolism, I would recommend indefinite anticoagulation.    3.  Patient has some chest related symptoms including shortness of breath on exertion and cough.  We will get CT chest.  He also has upcoming appointment with pulmonologist.    4.  Patient gets intermittent abdominal pain.  For further evaluation, we will get CT abdomen and pelvis.    5.  He had multiple questions which were all answered.  I will see him after the CT scan.    Thanks for the consult.    Total visit time of 60 minutes.  Time spent in today's visit, review of chart/investigations today and documentation today.

## 2023-09-12 ENCOUNTER — HOSPITAL ENCOUNTER (OUTPATIENT)
Dept: CT IMAGING | Facility: CLINIC | Age: 63
Discharge: HOME | End: 2023-09-12
Attending: INTERNAL MEDICINE
Payer: COMMERCIAL

## 2023-09-12 ENCOUNTER — HOSPITAL ENCOUNTER (OUTPATIENT)
Dept: ULTRASOUND IMAGING | Facility: CLINIC | Age: 63
Discharge: HOME | End: 2023-09-12
Attending: INTERNAL MEDICINE
Payer: COMMERCIAL

## 2023-09-12 DIAGNOSIS — I82.4Y2 DEEP VEIN THROMBOSIS (DVT) OF PROXIMAL VEIN OF LEFT LOWER EXTREMITY, UNSPECIFIED CHRONICITY (H): ICD-10-CM

## 2023-09-12 DIAGNOSIS — I26.09 OTHER ACUTE PULMONARY EMBOLISM WITH ACUTE COR PULMONALE (H): ICD-10-CM

## 2023-09-12 DIAGNOSIS — R10.84 ABDOMINAL PAIN, GENERALIZED: ICD-10-CM

## 2023-09-12 PROCEDURE — 250N000009 HC RX 250: Performed by: INTERNAL MEDICINE

## 2023-09-12 PROCEDURE — 250N000011 HC RX IP 250 OP 636: Performed by: INTERNAL MEDICINE

## 2023-09-12 PROCEDURE — 74177 CT ABD & PELVIS W/CONTRAST: CPT

## 2023-09-12 PROCEDURE — 93970 EXTREMITY STUDY: CPT

## 2023-09-12 RX ORDER — IOPAMIDOL 755 MG/ML
120 INJECTION, SOLUTION INTRAVASCULAR ONCE
Status: COMPLETED | OUTPATIENT
Start: 2023-09-12 | End: 2023-09-12

## 2023-09-12 RX ADMIN — IOPAMIDOL 75 ML: 755 INJECTION, SOLUTION INTRAVENOUS at 14:07

## 2023-09-12 RX ADMIN — SODIUM CHLORIDE 80 ML: 9 INJECTION, SOLUTION INTRAVENOUS at 14:07

## 2023-09-13 NOTE — RESULT ENCOUNTER NOTE
Dear  Nelida,    Ultrasound reveals blood clot in both the legs. Left leg blood clot is old. Right leg blood clot is new. We will discuss more during appointment.    Please, call me with any questions.    Jared Gonzalez MD

## 2023-09-15 NOTE — RESULT ENCOUNTER NOTE
Dear Mr. Krause,    CT scan is good. Blood clot has resolved. There are few minor abnormalities. We will monitor them. We will discuss more during appointment.    Please, call me with any questions.    Jared Gonzalez MD

## 2023-09-21 ENCOUNTER — ONCOLOGY VISIT (OUTPATIENT)
Dept: ONCOLOGY | Facility: CLINIC | Age: 63
End: 2023-09-21
Payer: COMMERCIAL

## 2023-09-21 VITALS
WEIGHT: 167.2 LBS | OXYGEN SATURATION: 97 % | DIASTOLIC BLOOD PRESSURE: 85 MMHG | SYSTOLIC BLOOD PRESSURE: 146 MMHG | BODY MASS INDEX: 23.99 KG/M2 | HEART RATE: 85 BPM | RESPIRATION RATE: 16 BRPM | TEMPERATURE: 98.1 F

## 2023-09-21 DIAGNOSIS — I26.09 OTHER ACUTE PULMONARY EMBOLISM WITH ACUTE COR PULMONALE (H): ICD-10-CM

## 2023-09-21 PROCEDURE — G0463 HOSPITAL OUTPT CLINIC VISIT: HCPCS | Performed by: INTERNAL MEDICINE

## 2023-09-21 PROCEDURE — 99214 OFFICE O/P EST MOD 30 MIN: CPT | Performed by: INTERNAL MEDICINE

## 2023-09-21 ASSESSMENT — PAIN SCALES - GENERAL: PAINLEVEL: NO PAIN (0)

## 2023-09-21 NOTE — PROGRESS NOTES
"Oncology Rooming Note    September 21, 2023 9:18 AM   Nish Krause is a 62 year old male who presents for:    Chief Complaint   Patient presents with    Oncology Clinic Visit     Initial Vitals: BP (!) 146/85   Pulse 85   Temp 98.1  F (36.7  C) (Oral)   Resp 16   Wt 75.8 kg (167 lb 3.2 oz)   SpO2 97%   BMI 23.99 kg/m   Estimated body mass index is 23.99 kg/m  as calculated from the following:    Height as of 8/29/23: 1.778 m (5' 10\").    Weight as of this encounter: 75.8 kg (167 lb 3.2 oz). Body surface area is 1.93 meters squared.  No Pain (0) Comment: Data Unavailable   No LMP for male patient.  Allergies reviewed: Yes  Medications reviewed: Yes    Medications: Medication refills not needed today.  Pharmacy name entered into [facility]:    [id number] [place] - [address] AT [facility] OF [facility] [address]  [facility] - [address]  ([facility] [id number]) [facility] - [place], [place] - [address]  [facility] - [place] [address] [facility] [address]  [facility] #[id number] - [place], [place] - [address] AT [facility] [address]    Clinical concerns: list given to BK Shari J. Schoenberger, CMA            "

## 2023-09-22 NOTE — PROGRESS NOTES
HEMATOLOGY HISTORY: Mr. Krause is a gentleman with recurrent thrombosis.    1.  In 12/2014, patient had pain in the left Achilles tendon/leg. He was evaluated by Orthopedics and was in CAM boot starting beginning of january'15.   -Ultrasound of the left lower extremity on 01/19/2015 revealed extensive occlusive acute/subacute deep vein thrombosis in the left lower extremity involving superficial femoral vein and extending into popliteal vein and posterior tibial vein.   -Warfarin for 3 months for this provoked DVT.       2.  On 02/16/2015, negative for factor V Leiden mutation and prothrombin gene mutation.     3. On 06/03/2015:  -Normal protein S.  -Normal protein C.  -Normal anti-thrombin III.  -Lupus is negative.  -Normal cardiolipin antibody IgG and IgM.    4.  Ultrasound of the left lower extremity on 06/03/2015 revealed mild chronic nonocclusive residual deep vein thrombosis  within the left popliteal vein.        5. Patient went to [place] between 05/11/2023 and 05/25/2023.  Flights were about 9 hours each way.    -On 06/24/2023, started to have pain mainly in the right side of the chest.      6. On 06/26/2023:  -Elevated D-dimer.  -CT chest angiogram revealed bilateral pulmonary embolism, left worse than right.  There is hepatic steatosis.  -Echocardiogram revealed EF of 55-60%. Right ventricle is mildly dilated.  Normal right ventricular systolic function.       SUBJECTIVE: Mr. Castillo is a 62-year-old gentleman with recurrent thrombosis.  On 06/26/2023 he was diagnosed with unprovoked bilateral pulmonary embolism.  He is currently on Eliquis.    He had bilateral lower extremity ultrasound on 09/12/2023.  It reveals bilateral lower extremity DVT.  Thrombus in left popliteal vein is chronic.    CT chest, abdomen and pelvis on 09/12/2023:  1.  Resolved bilateral pulmonary emboli. No new pulmonary embolus.Small linear intraluminal bed in right lower lobe segmental pulmonary arteries, the sequela of chronic  pulmonary embolism.  2.  No acute findings in the chest, abdomen and pelvis.  3.  Hepatic steatosis.  4.  Prostatomegaly with the median lobe of the prostate indenting the urinary bladder base.  5.  A 3 mm nonobstructing left renal calculus.  6.  Stable small subpleural opacity in the right upper lobe measuring 9 mm, likely a focus of scarring. Consider a follow-up chest CT in 6 months to ensure stability.    Patient still gets mildly short of breath on exertion.  Occasional cough.  No hemoptysis.  He has an appointment with pulmonologist.    No headache.  No dizziness.  No chest pain.  No nausea or vomiting.  No bleeding.  No pain/swelling/redness in the extremities.    EXAM:  He is alert and oriented x3.  Vitals: Reviewed.  Rest of the system not examined.    ASSESSMENT:  1.  A 62-year-old gentleman with recurrent thrombosis:  -Bilateral pulmonary embolism diagnosed on 06/26/2023. Unprovoked.  -Right lower extremity DVT diagnosed on 09/12/2023. Unprovoked.  -Left lower extremity deep venous thrombosis diagnosed on 01/19/2015. Provoked from wearing a Cam boot for Achilles tendonitis.  It has become chronic DVT.  2.  Other medical problem including hypertension and hyperlipidemia.  3.  Shortness of breath on exertion.    PLAN:  1.  I had a long discussion with the patient.      Ultrasound was reviewed with him.  It reveals bilateral DVT.  Patient does not have any leg related symptoms.  In the left, it is the residual DVT from  2015.  In the right, it is a new DVT.  It is the right DVT that caused pulmonary embolism in June.    CT scan was reviewed.  Pulmonary embolism has almost resolved. There is no evidence of malignancy.  There is a small subpleural opacity in the right upper lobe.  Likely scarring.    2.  For his unprovoked pulmonary embolism, patient needs indefinite anticoagulation.  He is agreeable for it.  He is on Eliquis.  He will continue on it.  He is tolerating it well.    General ways to reduce  thrombosis discussed.  Advised him to be active.    Complications of anticoagulation discussed.  Advised him to go to emergency room if he has bleeding from any site or any trauma.    3.  CT reveals subpleural opacity in the right upper lobe.  Patient is going to see pulmonologist.  They will decide regarding monitoring.    4.  CT reveals hepatic steatosis.  Advised him to diet, exercise and lose some weight.    5.  CT also revealed some prostatomegaly.  He is not having any BPH symptoms.  Advised him to follow-up with his PCP and have his PSA monitored.    6.  He had few questions which were all answered.  I will see him in 1 year time with CBC.  Advised him to call us with any questions or concerns.    Total visit time of 30 minutes.  Time spent in today's visit, review of chart/investigations today and documentation today.

## 2023-10-20 ENCOUNTER — TRANSFERRED RECORDS (OUTPATIENT)
Dept: HEALTH INFORMATION MANAGEMENT | Facility: CLINIC | Age: 63
End: 2023-10-20
Payer: COMMERCIAL

## 2023-10-23 ENCOUNTER — OFFICE VISIT (OUTPATIENT)
Dept: PULMONOLOGY | Facility: CLINIC | Age: 63
End: 2023-10-23
Payer: COMMERCIAL

## 2023-10-23 ENCOUNTER — OFFICE VISIT (OUTPATIENT)
Dept: PULMONOLOGY | Facility: CLINIC | Age: 63
End: 2023-10-23
Attending: INTERNAL MEDICINE
Payer: COMMERCIAL

## 2023-10-23 VITALS
HEART RATE: 81 BPM | HEIGHT: 69 IN | DIASTOLIC BLOOD PRESSURE: 84 MMHG | WEIGHT: 166 LBS | SYSTOLIC BLOOD PRESSURE: 138 MMHG | OXYGEN SATURATION: 97 % | BODY MASS INDEX: 24.59 KG/M2

## 2023-10-23 DIAGNOSIS — I26.09 OTHER ACUTE PULMONARY EMBOLISM WITH ACUTE COR PULMONALE (H): ICD-10-CM

## 2023-10-23 DIAGNOSIS — I27.81 COR PULMONALE (H): ICD-10-CM

## 2023-10-23 DIAGNOSIS — R91.8 PULMONARY NODULES: Primary | ICD-10-CM

## 2023-10-23 LAB — HGB BLD-MCNC: 14.7 G/DL

## 2023-10-23 PROCEDURE — 99204 OFFICE O/P NEW MOD 45 MIN: CPT | Performed by: INTERNAL MEDICINE

## 2023-10-23 PROCEDURE — 85018 HEMOGLOBIN: CPT | Mod: QW | Performed by: INTERNAL MEDICINE

## 2023-10-23 PROCEDURE — 94726 PLETHYSMOGRAPHY LUNG VOLUMES: CPT | Performed by: INTERNAL MEDICINE

## 2023-10-23 PROCEDURE — 94729 DIFFUSING CAPACITY: CPT | Performed by: INTERNAL MEDICINE

## 2023-10-23 PROCEDURE — 94060 EVALUATION OF WHEEZING: CPT | Performed by: INTERNAL MEDICINE

## 2023-10-23 NOTE — PROGRESS NOTES
PULMONARY OUTPATIENT CONSULT NOTE        Assessment:      Recurrent thrombosis   DVT LLE January 2015. Patient was wearing a cam boot for left foot injury on 12/2014. Hx left foot surgery 20 years ago. Received anticoagulation for 3 months.  Negative hypercoagulable work up on 2015. No family history of blood clots.    Bilateral pulmonary embolism 6/2023, after trip to [place].   Echocardiogram done on 6/2023 showed normal LV function, mild dilated RV with normal function.   Follow up venous US 9/12/2023 showed bilateral LE DVT. Chronic left popliteal DVT.   Chest CT scan 9/12/2023 showed resolution of pulmonary emboli.   Patient was seen by Oncology and recommending long term anticoagulation.   PFTs 10/23/2023 showed normal spirometry, lung volumes and normal diffusion capacity.   Subpleural opacity RUL  Stable opacity when comparing CT scans done on 6/2023 and 9/2023.   Likely scarring.   Follow up chest CT scan in 9 months      Plan:      Follow up chest CT 9 months   Long term anticoagulation   Follow up in 9 month        Chencho Horton  Pulmonary / Critical Care  October 23, 2023        CC:     Chief Complaint   Patient presents with    Consult     SOB/cough  Other acute pulmonary embolism with acute cor pulmonale (H) [I26.09]  Cor pulmonale (H) [I27.81]         HPI:         Kasi Krause is a 62 year old male who presents for evaluation of pulmonary embolism and abnormal CT ram.  Patient has history of left Achilles tendon surgery 20 years ago approx, recurrent thrombosis, DVTs and pulmonary embolism, on long term anticoagulation,   Reports left LE DVT on January 2015 after he was placed on a CAM boot for left foot injury, treated with coumadin for 3 months. Ultrasound of the left lower extremity on 06/03/2015 revealed mild chronic nonocclusive residual deep vein thrombosis.   Negative work up for hypercoagulable state.   Patient reports trip to [place] between 5/11 to 5/25/2023. Developed right  side chest pain on June 24, presented to ED on June 26, 2023, elevated D-dimer, CTA showed bilateral pulmonary embolism. Echocardiogram showed normal EF, mild dilated RV , normal RV function. Started on eliquist. Patient was see by Oncology recommending long term anticoagulation.   Venous US LE 9/12/2023 showed bilateral lower extremity DVT. Chest CT scan showed resolution of bilateral pulmonary emboli, small linear intraluminal bed in RLL, likely sequela of chronic pulmonary emboli, small subpleural opacity RUL which is stable from previous CT scan done on June 26, 2023    Patient denies exertional dyspnea, no cough or wheezes.   No limitations with activities.   No fever, chills or night sweats.   Denies use of inhalers.  No chest pain, orthopnea, PND, mild swelling of LEs  No snoring, refresh in AM.   Denies tobacco use.         Past Medical History :     Past Medical History:   Diagnosis Date    HTN (hypertension) 1/24/2013    Hyperlipidemia LDL goal <130 1/24/2013    Male erectile disorder 11/1/2016    Other acute pulmonary embolism with acute cor pulmonale (H) 6/26/2023    Pneumonia           Medications:     apixaban ANTICOAGULANT (ELIQUIS) 5 MG tablet, Take 1 tablet (5 mg) by mouth 2 times daily  Ascorbic Acid (VITAMIN C PO), Take 500 mg by mouth daily  lisinopril (ZESTRIL) 20 MG tablet, Take 1 tablet (20 mg) by mouth daily  ORDER FOR DME, Compression stockings-1 pair with 30-40MM pressure  VITAMIN D, CHOLECALCIFEROL, PO, Take 5,000 Units by mouth daily  cetirizine (ZYRTEC) 10 MG tablet, Take 10 mg by mouth daily  ORDER FOR DME, Equipment being ordered: compression stocking- one pair    No current facility-administered medications on file prior to visit.       Social History :     Social History     Socioeconomic History    Marital status:      Spouse name: Not on file    Number of children: Not on file    Years of education: Not on file    Highest education level: Not on file   Occupational History  "   Not on file   Tobacco Use    Smoking status: Never    Smokeless tobacco: Never    Tobacco comments:     Cigar 3-4 times per year   Substance and Sexual Activity    Alcohol use: Yes     Alcohol/week: 2.5 - 3.3 standard drinks of alcohol    Drug use: No    Sexual activity: Not Currently   Other Topics Concern    Parent/sibling w/ CABG, MI or angioplasty before 65F 55M? No   Social History Narrative    Not on file     Social Determinants of Health     Financial Resource Strain: Not on file   Food Insecurity: Not on file   Transportation Needs: Not on file   Physical Activity: Not on file   Stress: Not on file   Social Connections: Not on file   Interpersonal Safety: Not on file   Housing Stability: Not on file          Family History :     Family History   Problem Relation Age of Onset    Diabetes Mother     Diabetes Father     Other Cancer Sister         Ovarian       Review of Systems  A 12 point comprehensive review of systems was negative except as noted.        Objective:     /84 (BP Location: Left arm, Patient Position: Sitting, Cuff Size: Adult Regular)   Pulse 81   Ht 1.753 m (5' 9\")   Wt 75.3 kg (166 lb)   SpO2 97%   BMI 24.51 kg/m      Gen: awake, alert, no distress  HEENT: pink conjunctiva, moist mucosa, Mallampati II/IV  Neck: no thyromegaly, masses or JVD  Lungs: clear  CV: regular, no murmurs or gallops appreciated  Abdomen: soft, NT, BS wnl  Ext: no edema  Neuro: CN II-XII intact, non focal      Diagnostic tests:      02/16/2015, negative for factor V Leiden mutation and prothrombin gene mutation.      06/03/2015:  -Normal protein S.  -Normal protein C.  -Normal anti-thrombin III.  -Lupus is negative.  -Normal cardiolipin antibody IgG and IgM.     PFTs:          IMAGES:     US LOWER EXTREMITY VENOUS DUPLEX LEFT  3/16/2015 1:45 PM   IMPRESSION: Nonocclusive thrombus in the left superficial femoral and popliteal veins as above. Based on the images presented, there appears to be improvement in " amount of thrombus in the left superficial femoral vein.    VENOUS ULTRASOUND BOTH LEGS  9/12/2023 2:01 PM   IMPRESSION:   1. Bilateral lower extremity DVT as above. Thrombus in the left popliteal vein was also seen on the previous exam and may be chronic.    CT CHEST PULMONARY EMBOLISM WITH CONTRAST 6/26/2023 3:41 PM  CLINICAL HISTORY: Chest Pain. Tachycardia. History of deep venous thrombosis, recent travel, elevated D-dimer, question pulmonary embolism.  FINDINGS:  ANGIOGRAM CHEST: Positive study for bilateral pulmonary emboli, left worse than right. Thoracic aorta is negative for dissection. Right heart enlargement could indicate right heart strain.  LUNGS AND PLEURA: Trace dependent atelectasis. No effusions.  MEDIASTINUM/AXILLAE: No adenopathy or aneurysm.  CORONARY ARTERY CALCIFICATIONS: None.  UPPER ABDOMEN: Marked hepatic steatosis.   MUSCULOSKELETAL: No frankly destructive bony lesions.  IMPRESSION:  1.  Positive study for pulmonary embolism.  2.  Marked hepatic steatosis.    CT CHEST/ABDOMEN/PELVIS W CONTRAST 9/12/2023 2:21 PM  CLINICAL HISTORY: DVT and PE; Other acute pulmonary embolism with acute cor pulmonale (H); Deep vein thrombosis (DVT) of proximal vein of left lower extremity, unspecified chronicity (H); Abdominal pain, generalized  COMPARISON: 6/26/2023 chest CT  FINDINGS:   ANGIOGRAM CHEST: The previously seen extensive bilateral pulmonary emboli have resolved. Mild linear intraluminal webbing in the right lower lobe segmental pulmonary arteries, related to chronic pulmonary embolism. No new pulmonary emboli. Thoracic aorta is negative for dissection. No CT evidence of right heart strain.     LUNGS AND PLEURA: Mild bibasilar atelectasis. Stable mild subpleural scarring in the lateral right upper lobe with a triangular nodular opacity measuring 9 mm (14/107). No infiltrate or pleural effusion.   MEDIASTINUM/AXILLAE: No lymphadenopathy. No thoracic aortic aneurysm. No coronary artery cusp  indications. No pericardial effusion.  HEPATOBILIARY: Hepatic steatosis. A few subcentimeter hypodense lesions in the liver to characterize, likely cysts and hemangiomas. No calcified gallstones or biliary ductal dilatation.  PANCREAS: Normal.  SPLEEN: Normal.  ADRENAL GLANDS: Normal.  KIDNEYS/BLADDER: A 3 mm nonobstructing left renal calculus. No hydronephrosis or suspicious renal masses. Tiny hypodensity in the left kidney is too small to characterize, likely a cyst requiring no specific follow-up.  BOWEL: Extensive colonic diverticulosis. No small bowel or colonic obstruction or inflammatory changes. Normal appendix.  PELVIC ORGANS: Prostatomegaly with the median lobe of the prostate indenting the urinary bladder base.  ADDITIONAL FINDINGS: No lymphadenopathy in the abdomen and pelvis. No abdominal aortic aneurysm. The IVC, bilateral common iliac, external iliac and internal iliac veins are widely patent. No free fluid or fluid collections.  MUSCULOSKELETAL: No suspicious lesions of the bones.               IMPRESSION:  1.  Resolved bilateral pulmonary emboli. No new pulmonary embolus. Small linear intraluminal bed in right lower lobe segmental pulmonary arteries, the sequela of chronic pulmonary embolism.  2.  No acute findings in the chest, abdomen and pelvis.   3.  Hepatic steatosis.  4.  Prostatomegaly with the median lobe of the prostate indenting the urinary bladder base.  5.  A 3 mm nonobstructing left renal calculus.  6.  Stable small subpleural opacity in the right upper lobe measuring 9 mm, likely a focus of scarring.   Consider a follow-up chest CT in 6 months to ensure stability.    Echocardiogram   Interpretation Summary     The right ventricle is mildly dilated.  The right ventricular systolic function is normal.  The visual ejection fraction is 55-60%.  High normal PA pressures.  Contrast was used without apparent complications. There is no comparison study  available.  ______________________________________________________________________________  Left Ventricle  The left ventricle is normal in structure, function and size. A sigmoid septum is present. The visual ejection fraction is 55-60%. Septal motion is consistent with conduction abnormality.  Right Ventricle  The right ventricle is mildly dilated. The right ventricular systolic function is normal.  Atria  Normal left atrial size. Right atrial size is normal.  Mitral Valve  The mitral valve is normal in structure and function.  Tricuspid Valve  Normal tricuspid valve.  Aortic Valve  The aortic valve is normal in structure and function.  Pulmonic Valve  Normal pulmonic valve. There is trace pulmonic valvular regurgitation.  Vessels  The aortic root is normal size. The inferior vena cava is normal.  Pericardium  There is no pericardial effusion.  Rhythm  Sinus rhythm was noted.

## 2023-10-24 LAB
DLCOCOR-%PRED-PRE: 130 %
DLCOCOR-PRE: 34.32 ML/MIN/MMHG
DLCOUNC-%PRED-PRE: 130 %
DLCOUNC-PRE: 34.42 ML/MIN/MMHG
DLCOUNC-PRED: 26.31 ML/MIN/MMHG
ERV-%PRED-PRE: 126 %
ERV-PRE: 1.88 L
ERV-PRED: 1.49 L
EXPTIME-PRE: 7.7 SEC
FEF2575-%PRED-POST: 126 %
FEF2575-%PRED-PRE: 127 %
FEF2575-POST: 3.31 L/SEC
FEF2575-PRE: 3.34 L/SEC
FEF2575-PRED: 2.61 L/SEC
FEFMAX-%PRED-PRE: 106 %
FEFMAX-PRE: 9.32 L/SEC
FEFMAX-PRED: 8.77 L/SEC
FEV1-%PRED-PRE: 128 %
FEV1-PRE: 4.04 L
FEV1FEV6-PRE: 78 %
FEV1FEV6-PRED: 79 %
FEV1FVC-PRE: 76 %
FEV1FVC-PRED: 78 %
FEV1SVC-PRE: 76 %
FEV1SVC-PRED: 73 %
FIFMAX-PRE: 6.67 L/SEC
FRCPLETH-%PRED-PRE: 158 %
FRCPLETH-PRE: 5.67 L
FRCPLETH-PRED: 3.58 L
FVC-%PRED-PRE: 132 %
FVC-PRE: 5.33 L
FVC-PRED: 4.03 L
IC-%PRED-PRE: 115 %
IC-PRE: 3.45 L
IC-PRED: 2.98 L
RVPLETH-%PRED-PRE: 154 %
RVPLETH-PRE: 3.79 L
RVPLETH-PRED: 2.45 L
TLCPLETH-%PRED-PRE: 131 %
TLCPLETH-PRE: 9.12 L
TLCPLETH-PRED: 6.92 L
VA-%PRED-PRE: 116 %
VA-PRE: 7.31 L
VC-%PRED-PRE: 124 %
VC-PRE: 5.33 L
VC-PRED: 4.29 L

## 2023-10-30 ENCOUNTER — MYC MEDICAL ADVICE (OUTPATIENT)
Dept: PULMONOLOGY | Facility: CLINIC | Age: 63
End: 2023-10-30
Payer: COMMERCIAL

## 2023-10-30 DIAGNOSIS — R06.02 SHORTNESS OF BREATH: ICD-10-CM

## 2023-10-30 DIAGNOSIS — R53.83 FATIGUE: Primary | ICD-10-CM

## 2023-11-05 ENCOUNTER — HEALTH MAINTENANCE LETTER (OUTPATIENT)
Age: 63
End: 2023-11-05

## 2023-11-08 ENCOUNTER — DOCUMENTATION ONLY (OUTPATIENT)
Dept: PULMONOLOGY | Facility: CLINIC | Age: 63
End: 2023-11-08
Payer: COMMERCIAL

## 2023-11-08 ENCOUNTER — TELEPHONE (OUTPATIENT)
Dept: PULMONOLOGY | Facility: CLINIC | Age: 63
End: 2023-11-08
Payer: COMMERCIAL

## 2023-11-08 NOTE — PROGRESS NOTES
[facility] SIGNED BY: ALBIN RIOS  DATE: 11/8/23  STUDY PERFORMED ON (PAP/O2/RA): ON ROOM AIR  DOWNLOAD METHOD (BREATHE/NONIN): BREATHE  PROVIDER FAX: [phone]    DID YOU DOCUMENT CONTACT ATTEMPTS IN [facility]? YES  AFTER FIRST ATTEMPT PLEASE VERIFY PHONE NUMBER IN [facility] MATCHES PHONE NUMBER IN [facility].    11/8 AV- RCVD REFERRAL FOR BRODY, CALLED AND LVM FOR PATIENT TO COORDINATE PICKUP

## 2023-11-08 NOTE — TELEPHONE ENCOUNTER
DATE: 11/08/2023  DME PROVIDER: Ledy   SUPPLY ORDERED: BRODY   PROVIDER: Flako    Called customer service and spoke to Caren.    Rossi Hamlin LPN

## 2023-11-16 NOTE — PROGRESS NOTES
11/13 AV- CALLED AND LVM FOR PATIENT    11/16 AV- SPOKE WITH PT, HE STATED HE HAD SENT A MESSAGE TO HIS [facility] IN [facility] THAT HE IS GOING TO HOLD OFF ON MOVING AHEAD WITH BRODY FOR A WHILE DUE TO SOME SLOW/ STEADY IMPROVEMENTS.   I PROVIDED CALL BACK NUMBER TO PT TO CALL US WHEN READY TO PERFORM TEST.  - CALLED CLINIC LEFT MESSAGE FOR CARE TEAM INFORMING THEM

## 2024-01-10 ASSESSMENT — ENCOUNTER SYMPTOMS
COUGH: 0
DYSURIA: 0
HEADACHES: 0
HEMATURIA: 0
FEVER: 0
WEAKNESS: 0
HEMATOCHEZIA: 0
JOINT SWELLING: 0
SHORTNESS OF BREATH: 1
PARESTHESIAS: 0
ARTHRALGIAS: 1
HEARTBURN: 1
ABDOMINAL PAIN: 0
DIARRHEA: 0
CHILLS: 0
PALPITATIONS: 0
FREQUENCY: 0
EYE PAIN: 0
NERVOUS/ANXIOUS: 0
NAUSEA: 0
CONSTIPATION: 0
SORE THROAT: 0
DIZZINESS: 0
MYALGIAS: 0

## 2024-01-11 ENCOUNTER — OFFICE VISIT (OUTPATIENT)
Dept: INTERNAL MEDICINE | Facility: CLINIC | Age: 64
End: 2024-01-11
Payer: COMMERCIAL

## 2024-01-11 VITALS
DIASTOLIC BLOOD PRESSURE: 84 MMHG | WEIGHT: 167.1 LBS | RESPIRATION RATE: 15 BRPM | HEART RATE: 88 BPM | TEMPERATURE: 97.8 F | HEIGHT: 69 IN | SYSTOLIC BLOOD PRESSURE: 134 MMHG | BODY MASS INDEX: 24.75 KG/M2 | OXYGEN SATURATION: 98 %

## 2024-01-11 DIAGNOSIS — Z12.5 SCREENING PSA (PROSTATE SPECIFIC ANTIGEN): ICD-10-CM

## 2024-01-11 DIAGNOSIS — E78.5 HYPERLIPIDEMIA LDL GOAL <130: ICD-10-CM

## 2024-01-11 DIAGNOSIS — I26.09 OTHER ACUTE PULMONARY EMBOLISM WITH ACUTE COR PULMONALE (H): ICD-10-CM

## 2024-01-11 DIAGNOSIS — Z00.00 ENCOUNTER FOR ROUTINE ADULT HEALTH EXAMINATION WITHOUT ABNORMAL FINDINGS: Primary | ICD-10-CM

## 2024-01-11 DIAGNOSIS — I10 ESSENTIAL HYPERTENSION, BENIGN: ICD-10-CM

## 2024-01-11 DIAGNOSIS — Z12.11 COLON CANCER SCREENING: ICD-10-CM

## 2024-01-11 LAB
ALBUMIN SERPL BCG-MCNC: 4.6 G/DL (ref 3.5–5.2)
ALP SERPL-CCNC: 58 U/L (ref 40–150)
ALT SERPL W P-5'-P-CCNC: 29 U/L (ref 0–70)
ANION GAP SERPL CALCULATED.3IONS-SCNC: 16 MMOL/L (ref 7–15)
AST SERPL W P-5'-P-CCNC: 22 U/L (ref 0–45)
BILIRUB SERPL-MCNC: 1.1 MG/DL
BUN SERPL-MCNC: 10.3 MG/DL (ref 8–23)
CALCIUM SERPL-MCNC: 9.3 MG/DL (ref 8.8–10.2)
CHLORIDE SERPL-SCNC: 101 MMOL/L (ref 98–107)
CHOLEST SERPL-MCNC: 228 MG/DL
CREAT SERPL-MCNC: 0.75 MG/DL (ref 0.67–1.17)
DEPRECATED HCO3 PLAS-SCNC: 21 MMOL/L (ref 22–29)
EGFRCR SERPLBLD CKD-EPI 2021: >90 ML/MIN/1.73M2
FASTING STATUS PATIENT QL REPORTED: YES
GLUCOSE SERPL-MCNC: 86 MG/DL (ref 70–99)
HDLC SERPL-MCNC: 85 MG/DL
LDLC SERPL CALC-MCNC: 130 MG/DL
NONHDLC SERPL-MCNC: 143 MG/DL
POTASSIUM SERPL-SCNC: 5 MMOL/L (ref 3.4–5.3)
PROT SERPL-MCNC: 7 G/DL (ref 6.4–8.3)
PSA SERPL DL<=0.01 NG/ML-MCNC: 4.44 NG/ML (ref 0–4.5)
SODIUM SERPL-SCNC: 138 MMOL/L (ref 135–145)
TRIGL SERPL-MCNC: 67 MG/DL
TSH SERPL DL<=0.005 MIU/L-ACNC: 1.27 UIU/ML (ref 0.3–4.2)

## 2024-01-11 PROCEDURE — G0103 PSA SCREENING: HCPCS | Performed by: INTERNAL MEDICINE

## 2024-01-11 PROCEDURE — 99396 PREV VISIT EST AGE 40-64: CPT | Performed by: INTERNAL MEDICINE

## 2024-01-11 PROCEDURE — 84443 ASSAY THYROID STIM HORMONE: CPT | Performed by: INTERNAL MEDICINE

## 2024-01-11 PROCEDURE — 80061 LIPID PANEL: CPT | Performed by: INTERNAL MEDICINE

## 2024-01-11 PROCEDURE — 80053 COMPREHEN METABOLIC PANEL: CPT | Performed by: INTERNAL MEDICINE

## 2024-01-11 PROCEDURE — 36415 COLL VENOUS BLD VENIPUNCTURE: CPT | Performed by: INTERNAL MEDICINE

## 2024-01-11 ASSESSMENT — ENCOUNTER SYMPTOMS
NERVOUS/ANXIOUS: 0
JOINT SWELLING: 0
PALPITATIONS: 0
COUGH: 0
HEADACHES: 0
HEMATOCHEZIA: 0
NAUSEA: 0
CONSTIPATION: 0
DIARRHEA: 0
FREQUENCY: 0
ARTHRALGIAS: 1
DYSURIA: 0
SHORTNESS OF BREATH: 0
MYALGIAS: 0
WEAKNESS: 0
SORE THROAT: 0
HEARTBURN: 0
ABDOMINAL PAIN: 0
FEVER: 0
CHILLS: 0
EYE PAIN: 0
HEMATURIA: 0
PARESTHESIAS: 0
DIZZINESS: 0

## 2024-01-11 NOTE — PROGRESS NOTES
SUBJECTIVE:   Kasi is a 63 year old, presenting for the following:        Healthy Habits:     Getting at least 3 servings of Calcium per day:  Yes    Bi-annual eye exam:  Yes    Dental care twice a year:  NO    Sleep apnea or symptoms of sleep apnea:  Daytime drowsiness    Diet:  Regular (no restrictions)    Frequency of exercise:  4-5 days/week    Duration of exercise:  30-45 minutes    Taking medications regularly:  Yes    Medication side effects:  None    Other concners:  1. Right lower leg bruising/ itching. Previous blood clot in right leg   2. Ongoing fatigue since PE  3. Discuss lifeline screenings   4. Discuss precautions of taking eliquis. Has colonoscopy scheduled for 3/12/24       Today's PHQ-2 Score:       1/10/2024     8:26 PM   PHQ-2 ( 1999 Pfizer)   Q1: Little interest or pleasure in doing things 0   Q2: Feeling down, depressed or hopeless 0   PHQ-2 Score 0   Q1: Little interest or pleasure in doing things Not at all   Q2: Feeling down, depressed or hopeless Not at all   PHQ-2 Score 0       Current Outpatient Medications   Medication    apixaban ANTICOAGULANT (ELIQUIS) 5 MG tablet    Ascorbic Acid (VITAMIN C PO)    lisinopril (ZESTRIL) 20 MG tablet    ORDER FOR DME    VITAMIN D, CHOLECALCIFEROL, PO     No current facility-administered medications for this visit.         Immunization History   Administered Date(s) Administered    COVID-19 12+ (2023-24) (Pfizer) 10/04/2023    COVID-19 Bivalent 12+ (Pfizer) 09/21/2022    COVID-19 MONOVALENT 12+ (Pfizer) 10/26/2021    COVID-19 Monovalent 18+ (Moderna) 04/11/2022    COVID-19 Vaccine (Belia) 04/06/2021    Influenza (IIV3) PF 09/29/2021    Influenza Vaccine >6 months,quad, PF 12/10/2013, 01/29/2020, 10/01/2020    Influenza,INJ,MDCK,PF,Quad >6mo(Flucelvax) 01/29/2020, 10/06/2022    TDAP Vaccine (Adacel) 12/10/2013    Zoster recombinant adjuvanted (SHINGRIX) 07/28/2021, 01/07/2022           Social History     Tobacco Use    Smoking status: Never    Smokeless  "tobacco: Never    Tobacco comments:     Cigar 3-4 times per year   Substance Use Topics    Alcohol use: Yes     Alcohol/week: 2.5 - 3.3 standard drinks of alcohol         1/10/2024     8:25 PM   Alcohol Use   Prescreen: >3 drinks/day or >7 drinks/week? Yes   AUDIT SCORE  4       Last PSA:   PSA   Date Value Ref Range Status   06/04/2019 3.08 0 - 4 ug/L Final     Comment:     Assay Method:  Chemiluminescence using Siemens Vista analyzer     Prostate Specific Antigen Screen   Date Value Ref Range Status   09/13/2022 4.26 (H) 0.00 - 4.00 ug/L Final       Reviewed orders with patient. Reviewed health maintenance and updated orders accordingly - Yes  Lab work is in process    Reviewed and updated as needed this visit by clinical staff                  Reviewed and updated as needed this visit by Provider                     Review of Systems   Constitutional:  Negative for chills and fever.   HENT:  Positive for congestion. Negative for ear pain, hearing loss and sore throat.    Eyes:  Negative for pain and visual disturbance.   Respiratory:  Negative for cough and shortness of breath.    Cardiovascular:  Negative for chest pain, palpitations and peripheral edema.   Gastrointestinal:  Negative for abdominal pain, constipation, diarrhea, heartburn, hematochezia and nausea.   Genitourinary:  Negative for dysuria, frequency, genital sores, hematuria, impotence, penile discharge and urgency.   Musculoskeletal:  Positive for arthralgias. Negative for joint swelling and myalgias.   Skin:  Negative for rash.   Neurological:  Negative for dizziness, weakness, headaches and paresthesias.   Psychiatric/Behavioral:  Negative for mood changes. The patient is not nervous/anxious.        OBJECTIVE:   /84   Pulse 88   Temp 97.8  F (36.6  C) (Temporal)   Resp 15   Ht 1.753 m (5' 9\")   Wt 75.8 kg (167 lb 1.6 oz)   SpO2 98%   BMI 24.68 kg/m      Physical Exam  GENERAL: alert and no distress  EYES: Eyes grossly normal to " inspection, PERRL and conjunctivae and sclerae normal  HENT: ear canals and TM's normal, nose and mouth without ulcers or lesions  NECK: no adenopathy, no asymmetry, masses, or scars and thyroid normal to palpation  RESP: lungs clear to auscultation - no rales, rhonchi or wheezes  CV: regular rate and rhythm, normal S1 S2, no S3 or S4, no murmur, click or rub, minimal peripheral edema and peripheral pulses strong  ABDOMEN: soft, nontender, no hepatosplenomegaly, no masses and bowel sounds normal  MS: no gross musculoskeletal defects noted, no edema, mils static changes  NEURO: Normal strength and tone, mentation intact and speech normal  PSYCH: mentation appears normal, affect normal/bright    ASSESSMENT/PLAN:   (Z00.00) Encounter for routine adult health examination without abnormal findings  (primary encounter diagnosis)  Comment: Jimi updated routine tetanus booster to be done through his pharmacy.  Plan: Comprehensive metabolic panel, Lipid Profile,         TSH WITH FREE T4 REFLEX            (I10) Essential hypertension, benign  Comment: Stable on therapy and continue with current medical management as noted  Plan: Comprehensive metabolic panel            (E78.5) Hyperlipidemia LDL goal <130  Comment: As ordered as fasting per routine.  Plan: Lipid Profile            (I26.09) Other acute pulmonary embolism with acute cor pulmonale (H)  Comment: Early anticoagulated on therapy.  Following with oncology as directed.  Indefinite anticoagulation recommended  Plan:     (Z12.11) Colon cancer screening  Comment: Discussed with patient upcoming colonoscopy and need to confirm with Dr. Gonzalez timing of colonoscopy as appropriate  Plan: Fecal colorectal cancer screen FIT            (Z12.5) Screening PSA (prostate specific antigen)  Comment: Ordered as routine screening.  Plan: Prostate Specific Antigen Screen              Patient has been advised of split billing requirements and indicates understanding:  Yes      COUNSELING:   Reviewed preventive health counseling, as reflected in patient instructions       Regular exercise       Healthy diet/nutrition        He reports that he has never smoked. He has never used smokeless tobacco.          Kasi Cassidy MD  [facility]

## 2024-01-11 NOTE — LETTER
January 16, 2024      Kasi Krause  [address] [facility] [address]        Dear ,    We are writing to inform you of your test results.    I am pleased to inform you that your routine blood work including your thyroid, sodium, potassium, calcium, kidney and liver function tests are all normal.     Your total and LDL cholesterol is high and could be treated more aggressively with better diet, exercise and weight loss.  Please follow-up with me to discuss your further medication options/changes if you are interested.     Your thyroid function tests look good and thus I would not change anything at this point.     In addition, your PSA or prostate screening antigen is slightly higher but stable and should be repeated annually.    Resulted Orders   Comprehensive metabolic panel   Result Value Ref Range    Sodium 138 135 - 145 mmol/L      Comment:      Reference intervals for this test were updated on 09/26/2023 to more accurately reflect our healthy population. There may be differences in the flagging of prior results with similar values performed with this method. Interpretation of those prior results can be made in the context of the updated reference intervals.     Potassium 5.0 3.4 - 5.3 mmol/L    Carbon Dioxide (CO2) 21 (L) 22 - 29 mmol/L    Anion Gap 16 (H) 7 - 15 mmol/L    Urea Nitrogen 10.3 8.0 - 23.0 mg/dL    Creatinine 0.75 0.67 - 1.17 mg/dL    GFR Estimate >90 >60 mL/min/1.73m2    Calcium 9.3 8.8 - 10.2 mg/dL    Chloride 101 98 - 107 mmol/L    Glucose 86 70 - 99 mg/dL    Alkaline Phosphatase 58 40 - 150 U/L      Comment:      Reference intervals for this test were updated on 11/14/2023 to more accurately reflect our healthy population. There may be differences in the flagging of prior results with similar values performed with this method. Interpretation of those prior results can be made in the context of the updated reference intervals.    AST 22 0 - 45 U/L      Comment:      Reference  intervals for this test were updated on 6/12/2023 to more accurately reflect our healthy population. There may be differences in the flagging of prior results with similar values performed with this method. Interpretation of those prior results can be made in the context of the updated reference intervals.    ALT 29 0 - 70 U/L      Comment:      Reference intervals for this test were updated on 6/12/2023 to more accurately reflect our healthy population. There may be differences in the flagging of prior results with similar values performed with this method. Interpretation of those prior results can be made in the context of the updated reference intervals.      Protein Total 7.0 6.4 - 8.3 g/dL    Albumin 4.6 3.5 - 5.2 g/dL    Bilirubin Total 1.1 <=1.2 mg/dL   Lipid Profile   Result Value Ref Range    Cholesterol 228 (H) <200 mg/dL    Triglycerides 67 <150 mg/dL    Direct Measure HDL 85 >=40 mg/dL    LDL Cholesterol Calculated 130 (H) <=100 mg/dL    Non HDL Cholesterol 143 (H) <130 mg/dL    Patient Fasting > 8hrs? Yes     Narrative    Cholesterol  Desirable:  <200 mg/dL    Triglycerides  Normal:  Less than 150 mg/dL  Borderline High:  150-199 mg/dL  High:  200-499 mg/dL  Very High:  Greater than or equal to 500 mg/dL    Direct Measure HDL  Female:  Greater than or equal to 50 mg/dL   Male:  Greater than or equal to 40 mg/dL    LDL Cholesterol  Desirable:  <100mg/dL  Above Desirable:  100-129 mg/dL   Borderline High:  130-159 mg/dL   High:  160-189 mg/dL   Very High:  >= 190 mg/dL    Non HDL Cholesterol  Desirable:  130 mg/dL  Above Desirable:  130-159 mg/dL  Borderline High:  160-189 mg/dL  High:  190-219 mg/dL  Very High:  Greater than or equal to 220 mg/dL   Prostate Specific Antigen Screen   Result Value Ref Range    Prostate Specific Antigen Screen 4.44 0.00 - 4.50 ng/mL    Narrative    This result is obtained using the Roche Elecsys total PSA method on the lalo [id number] immunoassay analyzer. Results obtained with  different assay methods or kits cannot be used interchangeably.   TSH WITH FREE T4 REFLEX   Result Value Ref Range    TSH 1.27 0.30 - 4.20 uIU/mL       If you have any questions or concerns, please call the clinic at the number listed above.       Sincerely,      Kasi Cassidy MD

## 2024-02-05 DIAGNOSIS — I10 ESSENTIAL HYPERTENSION, BENIGN: ICD-10-CM

## 2024-02-05 RX ORDER — LISINOPRIL 20 MG/1
20 TABLET ORAL DAILY
Qty: 90 TABLET | Refills: 0 | Status: SHIPPED | OUTPATIENT
Start: 2024-02-05 | End: 2024-08-04

## 2024-02-08 ENCOUNTER — TRANSFERRED RECORDS (OUTPATIENT)
Dept: HEALTH INFORMATION MANAGEMENT | Facility: CLINIC | Age: 64
End: 2024-02-08

## 2024-02-29 ENCOUNTER — TELEPHONE (OUTPATIENT)
Dept: ONCOLOGY | Facility: CLINIC | Age: 64
End: 2024-02-29
Payer: COMMERCIAL

## 2024-02-29 NOTE — TELEPHONE ENCOUNTER
Writer called to follow up with [facility] and informed them it is ok to hold Eliquis for 48 hours prior to 3/12 colonoscopy.     Gaviota Petersen RN

## 2024-02-29 NOTE — TELEPHONE ENCOUNTER
----- Message from Jared Gonzalez MD sent at 2/29/2024  1:43 PM CST -----  Regarding: RE: hold Eliquis 48 hrs prior to Colonoscopy  OK to hold Eliquis for 48 hours before colonoscopy.    bk  ----- Message -----  From: Gaviota Petersen RN  Sent: 2/29/2024  11:36 AM CST  To: Jared Gonzalez MD  Subject: hold Eliquis 48 hrs prior to Colonoscopy         Dr. Gonzalez,       I received a call from [employer] requesting approval to hold Eliquis 48 hrs prior to colonoscopy on 3/12.    Please advise.    Thank you,   Suha

## 2024-03-12 ENCOUNTER — TRANSFERRED RECORDS (OUTPATIENT)
Dept: HEALTH INFORMATION MANAGEMENT | Facility: CLINIC | Age: 64
End: 2024-03-12
Payer: COMMERCIAL

## 2024-03-18 ENCOUNTER — PATIENT OUTREACH (OUTPATIENT)
Dept: GASTROENTEROLOGY | Facility: CLINIC | Age: 64
End: 2024-03-18
Payer: COMMERCIAL

## 2024-07-19 ENCOUNTER — HOSPITAL ENCOUNTER (OUTPATIENT)
Dept: CT IMAGING | Facility: CLINIC | Age: 64
Discharge: HOME | End: 2024-07-19
Attending: INTERNAL MEDICINE | Admitting: INTERNAL MEDICINE
Payer: COMMERCIAL

## 2024-07-19 DIAGNOSIS — R91.8 PULMONARY NODULES: ICD-10-CM

## 2024-07-19 PROCEDURE — 71250 CT THORAX DX C-: CPT

## 2024-07-29 ENCOUNTER — ONCOLOGY VISIT (OUTPATIENT)
Dept: ONCOLOGY | Facility: CLINIC | Age: 64
End: 2024-07-29
Attending: INTERNAL MEDICINE
Payer: COMMERCIAL

## 2024-07-29 VITALS
DIASTOLIC BLOOD PRESSURE: 93 MMHG | BODY MASS INDEX: 24.88 KG/M2 | HEIGHT: 69 IN | RESPIRATION RATE: 16 BRPM | SYSTOLIC BLOOD PRESSURE: 158 MMHG | HEART RATE: 103 BPM | OXYGEN SATURATION: 96 % | WEIGHT: 168 LBS

## 2024-07-29 DIAGNOSIS — I26.09 OTHER ACUTE PULMONARY EMBOLISM WITH ACUTE COR PULMONALE (H): ICD-10-CM

## 2024-07-29 DIAGNOSIS — I82.4Y2 DEEP VEIN THROMBOSIS (DVT) OF PROXIMAL VEIN OF LEFT LOWER EXTREMITY, UNSPECIFIED CHRONICITY (H): Primary | ICD-10-CM

## 2024-07-29 DIAGNOSIS — D72.819 LEUKOPENIA, UNSPECIFIED TYPE: ICD-10-CM

## 2024-07-29 LAB
ERYTHROCYTE [DISTWIDTH] IN BLOOD BY AUTOMATED COUNT: 16.4 % (ref 10–15)
HCT VFR BLD AUTO: 44 % (ref 40–53)
HGB BLD-MCNC: 15 G/DL (ref 13.3–17.7)
MCH RBC QN AUTO: 34.4 PG (ref 26.5–33)
MCHC RBC AUTO-ENTMCNC: 34.1 G/DL (ref 31.5–36.5)
MCV RBC AUTO: 101 FL (ref 78–100)
PLATELET # BLD AUTO: 146 10E3/UL (ref 150–450)
RBC # BLD AUTO: 4.36 10E6/UL (ref 4.4–5.9)
WBC # BLD AUTO: 2.7 10E3/UL (ref 4–11)

## 2024-07-29 PROCEDURE — 99214 OFFICE O/P EST MOD 30 MIN: CPT | Performed by: INTERNAL MEDICINE

## 2024-07-29 PROCEDURE — 85027 COMPLETE CBC AUTOMATED: CPT | Performed by: INTERNAL MEDICINE

## 2024-07-29 PROCEDURE — 36415 COLL VENOUS BLD VENIPUNCTURE: CPT | Performed by: INTERNAL MEDICINE

## 2024-07-29 PROCEDURE — G0463 HOSPITAL OUTPT CLINIC VISIT: HCPCS | Performed by: INTERNAL MEDICINE

## 2024-07-29 ASSESSMENT — PAIN SCALES - GENERAL: PAINLEVEL: NO PAIN (0)

## 2024-07-29 NOTE — RESULT ENCOUNTER NOTE
Dear Mr. Krause,    WBC and platelet is low today.  It was normal in July 2023.  Please have another blood rechecked in next few weeks.  I want to make sure that it is not getting worse.    Please, call me with any questions.    Jared Gnozalez MD

## 2024-07-29 NOTE — LETTER
7/29/2024      Kasi Krause  [address] [facility] [address]      Dear Colleague,    Thank you for referring your patient, Kasi Krause, to the [facility]. Please see a copy of my visit note below.    HEMATOLOGY HISTORY: Mr. Krause is a gentleman with recurrent thrombosis.     1.  In 12/2014, patient had pain in the left Achilles tendon/leg. He was evaluated by Orthopedics and was in CAM boot starting beginning of january'15.   -Ultrasound of the left lower extremity on 01/19/2015 revealed extensive occlusive acute/subacute deep vein thrombosis in the left lower extremity involving superficial femoral vein and extending into popliteal vein and posterior tibial vein.   -Warfarin for 3 months for this provoked DVT.        2.  On 02/16/2015, negative for factor V Leiden mutation and prothrombin gene mutation.      3. On 06/03/2015:  -Normal protein S.  -Normal protein C.  -Normal anti-thrombin III.  -Lupus is negative.  -Normal cardiolipin antibody IgG and IgM.     4.  Ultrasound of the left lower extremity on 06/03/2015 revealed mild chronic nonocclusive residual deep vein thrombosis  within the left popliteal vein.        5. Patient went to [place] between 05/11/2023 and 05/25/2023.  Flights were about 9 hours each way.    -On 06/24/2023, started to have pain mainly in the right side of the chest.       6. On 06/26/2023:  -Elevated D-dimer.  -CT chest angiogram revealed bilateral pulmonary embolism, left worse than right.  There is hepatic steatosis.  -Echocardiogram revealed EF of 55-60%. Right ventricle is mildly dilated.  Normal right ventricular systolic function.    -No leg US done at that time.    7.  CT chest, abdomen and pelvis on 09/12/2023 did not reveal any pulmonary embolism.  No evidence of malignancy.  -Bilateral lower extremity ultrasound on 09/12/2023 reveals bilateral lower extremity DVT.  Thrombus in left popliteal vein is chronic DVT.  Right lower extremity  DVT was new.  This was unprovoked.     SUBJECTIVE: Mr. Castillo is a 63-year-old gentleman with recurrent thrombosis as described above.  He is on Eliquis.  He is tolerating it well.  No bleeding or bruising complications.    He had CT chest without contrast on 07/19/2024:  Stable subpleural nodular opacity in the right upper lobe measuring up to 9 mm.    Patient overall doing good.  No excessive fatigue.  No headache.  No dizziness.  No chest pain.  No shortness of breath at rest.  No abdominal pain.  No nausea or vomiting.  No bleeding.  No urinary or bowel complaints.  At times he feels some tightness in the left lower extremity.  He also has noticed some varicose vein left more than right.  It does not bother him.     EXAM:  He is alert and oriented x3.  Vitals: Reviewed.  Extremities: No edema.  No calf swelling or tenderness.  There is bilateral varicose vein, left more than right.  Rest of the system not examined.     ASSESSMENT:  1.  A 63-year-old gentleman with recurrent thrombosis:  -Bilateral pulmonary embolism diagnosed on 06/26/2023. Unprovoked.  -Right lower extremity DVT diagnosed on 09/12/2023. Unprovoked.  -Left lower extremity deep venous thrombosis diagnosed on 01/19/2015. Provoked from wearing a Cam boot for Achilles tendonitis.  It has become chronic DVT.  2.  Lung nodule, stable.  3.  Varicose veins.  4.  Fatty liver.     PLAN:  Continue eliquis.  Leg ultrasound in next few weeks.  See me in 1 year with CBC.  Diet and exercise.  Recheck BP.    DISCUSSION:  1.  Patient is doing well from thrombosis.  He has not had any new thrombosis.    He is on Eliquis.  He will continue on it.  As he had unprovoked thrombosis, plan is for indefinite anticoagulation.    Patient brought up the question of stopping anticoagulation in future.  Explained to the patient that we can consider stopping it.  After stopping he will be at increased risk of thrombosis as he had unprovoked thrombosis.  For now he will  "continue on Eliquis.  Discussion regarding stopping in future when he wants it.    2.  Will get ultrasound of lower extremities to follow-up on bilateral DVT.    3.  He has mild bilateral lower extremity varicose vein.  It does not bother him.  He will let us know if it starts bothering him.    4.  CT scan had revealed fatty liver.  Advised him to diet, exercise and lose weight.    5.  He has lung nodule.  CT reveals stable lung nodule.  He follows up with pulmonologist.  Explained to him that this is benign nodule.    6.  Will check a CBC today.    7.  I will see him in 1 year time.  Advised him to call us with any questions or concerns.    Addendum: CBC done today reveals low WBC of 2.7 and low platelet of 146.  Normal hemoglobin.  Will have CBC checked in next few weeks.     Total visit time of 30 minutes.  Time spent in today's visit, review of chart/investigations today and documentation today.     Oncology Rooming Note    July 29, 2024 8:14 AM   Nish Krause is a 63 year old male who presents for:    Chief Complaint   Patient presents with     Oncology Clinic Visit     Initial Vitals: There were no vitals taken for this visit. Estimated body mass index is 24.68 kg/m  as calculated from the following:    Height as of 1/11/24: 1.753 m (5' 9\").    Weight as of 1/11/24: 75.8 kg (167 lb 1.6 oz). There is no height or weight on file to calculate BSA.  Data Unavailable Comment: Data Unavailable   No LMP for male patient.  Allergies reviewed: Yes  Medications reviewed: Yes    Medications: Medication refills not needed today.  Pharmacy name entered into [facility]:    [facility][id number] - [place] - [address] AT [facility] OF  [address]  (INACTIVE SEE [facility] [id number]) [facility] - [place], [place] - [address]  [facility] - [facility] [address]  [facility] #[id number] - [place], [place] - [address] AT [facility] OF [address]    Frailty Screening:   Is the patient here for a new oncology consult visit in cancer care? 2. No  Ibeth Nichols MA              Again, thank you for allowing me to participate in the care of your patient.        Sincerely,        Jared Gonzalez MD

## 2024-07-29 NOTE — PATIENT INSTRUCTIONS
Continue eliquis.  Leg ultrasound in next few weeks.  See me in 1 year with CBC.  Diet and exercise.  Recheck BP.

## 2024-07-29 NOTE — PROGRESS NOTES
"Oncology Rooming Note    July 29, 2024 8:14 AM   Nish Krause is a 63 year old male who presents for:    Chief Complaint   Patient presents with    Oncology Clinic Visit     Initial Vitals: There were no vitals taken for this visit. Estimated body mass index is 24.68 kg/m  as calculated from the following:    Height as of 1/11/24: 1.753 m (5' 9\").    Weight as of 1/11/24: 75.8 kg (167 lb 1.6 oz). There is no height or weight on file to calculate BSA.  Data Unavailable Comment: Data Unavailable   No LMP for male patient.  Allergies reviewed: Yes  Medications reviewed: Yes    Medications: Medication refills not needed today.  Pharmacy name entered into [facility]:    [facility] #[id number] - [facility] [place] - [address] AT [facility] OF [facility] [address]  ([address]ACT[address] SEE [facility] [id number]) [facility] - [place], [place] - [address]  [facility] [place] [address] [facility] [address]  [facility] #[id number] - [place], [place] - [address] AT [facility] OF [address]    Frailty Screening:   Is the patient here for a new oncology consult visit in cancer care? 2. No  Ibeth Nichols MA            "

## 2024-07-29 NOTE — PROGRESS NOTES
HEMATOLOGY HISTORY: Mr. Krause is a gentleman with recurrent thrombosis.     1.  In 12/2014, patient had pain in the left Achilles tendon/leg. He was evaluated by Orthopedics and was in CAM boot starting beginning of january'15.   -Ultrasound of the left lower extremity on 01/19/2015 revealed extensive occlusive acute/subacute deep vein thrombosis in the left lower extremity involving superficial femoral vein and extending into popliteal vein and posterior tibial vein.   -Warfarin for 3 months for this provoked DVT.        2.  On 02/16/2015, negative for factor V Leiden mutation and prothrombin gene mutation.      3. On 06/03/2015:  -Normal protein S.  -Normal protein C.  -Normal anti-thrombin III.  -Lupus is negative.  -Normal cardiolipin antibody IgG and IgM.     4.  Ultrasound of the left lower extremity on 06/03/2015 revealed mild chronic nonocclusive residual deep vein thrombosis  within the left popliteal vein.        5. Patient went to [place] between 05/11/2023 and 05/25/2023.  Flights were about 9 hours each way.    -On 06/24/2023, started to have pain mainly in the right side of the chest.       6. On 06/26/2023:  -Elevated D-dimer.  -CT chest angiogram revealed bilateral pulmonary embolism, left worse than right.  There is hepatic steatosis.  -Echocardiogram revealed EF of 55-60%. Right ventricle is mildly dilated.  Normal right ventricular systolic function.    -No leg US done at that time.    7.  CT chest, abdomen and pelvis on 09/12/2023 did not reveal any pulmonary embolism.  No evidence of malignancy.  -Bilateral lower extremity ultrasound on 09/12/2023 reveals bilateral lower extremity DVT.  Thrombus in left popliteal vein is chronic DVT.  Right lower extremity DVT was new.  This was unprovoked.     SUBJECTIVE: Mr. Castillo is a 63-year-old gentleman with recurrent thrombosis as described above.  He is on Eliquis.  He is tolerating it well.  No bleeding or bruising complications.    He had CT chest  without contrast on 07/19/2024:  Stable subpleural nodular opacity in the right upper lobe measuring up to 9 mm.    Patient overall doing good.  No excessive fatigue.  No headache.  No dizziness.  No chest pain.  No shortness of breath at rest.  No abdominal pain.  No nausea or vomiting.  No bleeding.  No urinary or bowel complaints.  At times he feels some tightness in the left lower extremity.  He also has noticed some varicose vein left more than right.  It does not bother him.     EXAM:  He is alert and oriented x3.  Vitals: Reviewed.  Extremities: No edema.  No calf swelling or tenderness.  There is bilateral varicose vein, left more than right.  Rest of the system not examined.     ASSESSMENT:  1.  A 63-year-old gentleman with recurrent thrombosis:  -Bilateral pulmonary embolism diagnosed on 06/26/2023. Unprovoked.  -Right lower extremity DVT diagnosed on 09/12/2023. Unprovoked.  -Left lower extremity deep venous thrombosis diagnosed on 01/19/2015. Provoked from wearing a Cam boot for Achilles tendonitis.  It has become chronic DVT.  2.  Lung nodule, stable.  3.  Varicose veins.  4.  Fatty liver.     PLAN:  Continue eliquis.  Leg ultrasound in next few weeks.  See me in 1 year with CBC.  Diet and exercise.  Recheck BP.    DISCUSSION:  1.  Patient is doing well from thrombosis.  He has not had any new thrombosis.    He is on Eliquis.  He will continue on it.  As he had unprovoked thrombosis, plan is for indefinite anticoagulation.    Patient brought up the question of stopping anticoagulation in future.  Explained to the patient that we can consider stopping it.  After stopping he will be at increased risk of thrombosis as he had unprovoked thrombosis.  For now he will continue on Eliquis.  Discussion regarding stopping in future when he wants it.    2.  Will get ultrasound of lower extremities to follow-up on bilateral DVT.    3.  He has mild bilateral lower extremity varicose vein.  It does not bother him.   He will let us know if it starts bothering him.    4.  CT scan had revealed fatty liver.  Advised him to diet, exercise and lose weight.    5.  He has lung nodule.  CT reveals stable lung nodule.  He follows up with pulmonologist.  Explained to him that this is benign nodule.    6.  Will check a CBC today.    7.  I will see him in 1 year time.  Advised him to call us with any questions or concerns.    Addendum: CBC done today reveals low WBC of 2.7 and low platelet of 146.  Normal hemoglobin.  Will have CBC checked in next few weeks.     Total visit time of 30 minutes.  Time spent in today's visit, review of chart/investigations today and documentation today.

## 2024-08-04 ENCOUNTER — MYC REFILL (OUTPATIENT)
Dept: INTERNAL MEDICINE | Facility: CLINIC | Age: 64
End: 2024-08-04
Payer: COMMERCIAL

## 2024-08-04 DIAGNOSIS — I10 ESSENTIAL HYPERTENSION, BENIGN: ICD-10-CM

## 2024-08-05 RX ORDER — LISINOPRIL 20 MG/1
20 TABLET ORAL DAILY
Qty: 90 TABLET | Refills: 0 | Status: SHIPPED | OUTPATIENT
Start: 2024-08-05

## 2024-08-06 ENCOUNTER — HOSPITAL ENCOUNTER (OUTPATIENT)
Dept: ULTRASOUND IMAGING | Facility: CLINIC | Age: 64
Discharge: HOME | End: 2024-08-06
Attending: INTERNAL MEDICINE | Admitting: INTERNAL MEDICINE
Payer: COMMERCIAL

## 2024-08-06 DIAGNOSIS — I82.4Y2 DEEP VEIN THROMBOSIS (DVT) OF PROXIMAL VEIN OF LEFT LOWER EXTREMITY, UNSPECIFIED CHRONICITY (H): ICD-10-CM

## 2024-08-06 PROCEDURE — 93970 EXTREMITY STUDY: CPT

## 2024-08-08 NOTE — RESULT ENCOUNTER NOTE
Dear Mr. Krause,    Ultrasound of leg does not reveal any new blood clot. Old clots are stable.    Please, call me with any questions.    Jared Gonzalez MD

## 2024-08-30 ENCOUNTER — MYC MEDICAL ADVICE (OUTPATIENT)
Dept: PULMONOLOGY | Facility: CLINIC | Age: 64
End: 2024-08-30
Payer: COMMERCIAL

## 2024-10-31 ENCOUNTER — TRANSFERRED RECORDS (OUTPATIENT)
Dept: HEALTH INFORMATION MANAGEMENT | Facility: CLINIC | Age: 64
End: 2024-10-31

## 2024-11-13 ENCOUNTER — MYC REFILL (OUTPATIENT)
Dept: INTERNAL MEDICINE | Facility: CLINIC | Age: 64
End: 2024-11-13
Payer: COMMERCIAL

## 2024-11-13 DIAGNOSIS — I10 ESSENTIAL HYPERTENSION, BENIGN: ICD-10-CM

## 2024-11-13 NOTE — TELEPHONE ENCOUNTER
There is no pharmacy designated for refill.  Please note per protocol pharmacy should be designated for refill request.

## 2024-11-14 RX ORDER — LISINOPRIL 20 MG/1
20 TABLET ORAL DAILY
Qty: 90 TABLET | Refills: 0 | Status: SHIPPED | OUTPATIENT
Start: 2024-11-14

## 2024-11-26 ENCOUNTER — E-VISIT (OUTPATIENT)
Dept: INTERNAL MEDICINE | Facility: CLINIC | Age: 64
End: 2024-11-26
Payer: COMMERCIAL

## 2024-11-26 DIAGNOSIS — J06.9 UPPER RESPIRATORY TRACT INFECTION, UNSPECIFIED TYPE: Primary | ICD-10-CM

## 2024-11-26 PROCEDURE — 99207 PR NON-BILLABLE SERV PER CHARTING: CPT | Performed by: INTERNAL MEDICINE

## 2024-11-27 ENCOUNTER — E-VISIT (OUTPATIENT)
Dept: URGENT CARE | Facility: CLINIC | Age: 64
End: 2024-11-27
Payer: COMMERCIAL

## 2024-11-27 DIAGNOSIS — J06.9 VIRAL URI WITH COUGH: Primary | ICD-10-CM

## 2024-11-27 RX ORDER — BENZONATATE 100 MG/1
100 CAPSULE ORAL 3 TIMES DAILY PRN
Qty: 30 CAPSULE | Refills: 0 | Status: SHIPPED | OUTPATIENT
Start: 2024-11-27

## 2024-11-27 NOTE — PATIENT INSTRUCTIONS
Thank you for choosing us for your care. I have placed an order for a prescription so that you can start treatment. View your full visit summary for details by clicking on the link below. Your pharmacist will able to address any questions you may have about the medication.     If you're not feeling better within 5-7 days, please schedule an appointment.  You can schedule an appointment right here in [facility], or call [phone]  If the visit is for the same symptoms as your eVisit, we'll refund the cost of your eVisit if seen within seven days.

## 2024-12-12 ENCOUNTER — PATIENT OUTREACH (OUTPATIENT)
Dept: CARE COORDINATION | Facility: CLINIC | Age: 64
End: 2024-12-12
Payer: COMMERCIAL

## 2024-12-18 ENCOUNTER — OFFICE VISIT (OUTPATIENT)
Dept: PULMONOLOGY | Facility: CLINIC | Age: 64
End: 2024-12-18
Attending: INTERNAL MEDICINE
Payer: COMMERCIAL

## 2024-12-18 VITALS
SYSTOLIC BLOOD PRESSURE: 153 MMHG | RESPIRATION RATE: 18 BRPM | HEART RATE: 54 BPM | WEIGHT: 169 LBS | DIASTOLIC BLOOD PRESSURE: 91 MMHG | BODY MASS INDEX: 24.96 KG/M2 | OXYGEN SATURATION: 99 %

## 2024-12-18 DIAGNOSIS — R91.8 PULMONARY NODULES: Primary | ICD-10-CM

## 2024-12-18 PROCEDURE — 99215 OFFICE O/P EST HI 40 MIN: CPT | Performed by: STUDENT IN AN ORGANIZED HEALTH CARE EDUCATION/TRAINING PROGRAM

## 2024-12-18 NOTE — LETTER
12/18/2024      Kasi Krause  [address] [facility] [address]      Dear Colleague,    Thank you for referring your patient, Kasi Krause, to the [facility] [place]. Please see a copy of my visit note below.    Pulmonary Clinic Note    Date of Service: 12/18/2024     Chief Complaint   Patient presents with     New Patient     Pulmonary nodules +1 more           A/P:  64M HTN, HLD, prior PE (6/2023) being seen for follow up pulmoanry nodules. PFTs w/ evidence of air-trapping, hyperinflation, and increased diffusion capacity. This can be seen in asthma, but he is asymptomatic. CT in July w/ stable pulmonary nodule.     - repeat CT 7/2025  - advised to contact us if his breathing gets worse  - OK to substitute medications based on formulary     History:  64M HTN, HLD, prior PE (6/2023) being seen for follow up pulmoanry nodules. Previously seen by Dr. Flako Horton in [place]. Last seen 10/2023. He is not prescribed any pulmonary medications. He is feeling well. He denies MARSHALL or SOB at rest. No chest pain or tightness. No wheezing. Recent cough w/ sinus infection, now improved. No chronic cough. No nocturnal cough. No orthopnea or PND. No LE edema. No F/C. No night sweats. No wt loss.     Smoking: never    Bird exposure: no             Animal exposure: no        Inhalation exposure: no    Occupation:                           10 point review of systems negative, aside from that mentioned in HPI.    BP (!) 153/91   Pulse 54   Resp 18   Wt 76.7 kg (169 lb)   SpO2 99%   BMI 24.96 kg/m    Gen: well-appearing  HEENT: anicteric  Card: RRR  Pulm: clear bilaterally   Abd: soft  MSK: no edema, no acute joint abnormality   Skin: no obvious rash  Psych: normal affect  Neuro: alert and oriented     Labs:  Personally reviewed    Imaging/Studies: Personally reviewed  CT Chest (7/2024) - stable subpleural scarring RUL and RLL, unchanged 9mm RUL nodule   PFTs (10/2023) -  normal spirometry, e/o air-trapping w/ hyperinflation, elevated DLCO    Past Medical History:   Diagnosis Date     HTN (hypertension) 1/24/2013     Hyperlipidemia LDL goal <130 1/24/2013     Male erectile disorder 11/1/2016     Other acute pulmonary embolism with acute cor pulmonale (H) 6/26/2023     Pneumonia      Past Surgical History:   Procedure Laterality Date     broken finger pinky [       COLONOSCOPY  2014     ORTHOPEDIC SURGERY       SOFT TISSUE SURGERY  2000    Torn left achilles tendon     Family History   Problem Relation Age of Onset     Diabetes Mother      Diabetes Father      Other Cancer Sister         Ovarian     Social History     Socioeconomic History     Marital status:      Spouse name: Not on file     Number of children: Not on file     Years of education: Not on file     Highest education level: Not on file   Occupational History     Not on file   Tobacco Use     Smoking status: Never     Smokeless tobacco: Never     Tobacco comments:     Cigar 3-4 times per year   Vaping Use     Vaping status: Never Used   Substance and Sexual Activity     Alcohol use: Yes     Alcohol/week: 2.5 - 3.3 standard drinks of alcohol     Drug use: No     Sexual activity: Not Currently   Other Topics Concern     Parent/sibling w/ CABG, MI or angioplasty before 65F 55M? No   Social History Narrative     Not on file     Social Drivers of Health     Financial Resource Strain: Low Risk  (1/10/2024)    Financial Resource Strain      Within the past 12 months, have you or your family members you live with been unable to get utilities (heat, electricity) when it was really needed?: No   Food Insecurity: Low Risk  (1/10/2024)    Food Insecurity      Within the past 12 months, did you worry that your food would run out before you got money to buy more?: No      Within the past 12 months, did the food you bought just not last and you didn t have money to get more?: No   Transportation Needs: Low Risk  (1/10/2024)     Transportation Needs      Within the past 12 months, has lack of transportation kept you from medical appointments, getting your medicines, non-medical meetings or appointments, work, or from getting things that you need?: No   Physical Activity: Not on file   Stress: Not on file   Social Connections: Not on file   Interpersonal Safety: Not on file   Housing Stability: Low Risk  (1/10/2024)    Housing Stability      Do you have housing? : Yes      Are you worried about losing your housing?: No       45 minutes spent reviewing chart, reviewing test results, talking with and examining patient, formulating plan, and documentation on the day of the encounter.    Jacky Price MD  Pulmonary and Critical Care Medicine  [facility]       Again, thank you for allowing me to participate in the care of your patient.        Sincerely,        Jacky Price MD

## 2024-12-18 NOTE — PATIENT INSTRUCTIONS
I would like to get a repeat CT scan of your chest in July 2025. Your pulmonary function tests may indicate asthma, but given you are asymptomatic, we do not need to do anything. If your breathing gets worse, please let us know.

## 2024-12-18 NOTE — PROGRESS NOTES
Pulmonary Clinic Note    Date of Service: 12/18/2024     Chief Complaint   Patient presents with    New Patient     Pulmonary nodules +1 more           A/P:  64M HTN, HLD, prior PE (6/2023) being seen for follow up pulmoanry nodules. PFTs w/ evidence of air-trapping, hyperinflation, and increased diffusion capacity. This can be seen in asthma, but he is asymptomatic. CT in July w/ stable pulmonary nodule.     - repeat CT 7/2025  - advised to contact us if his breathing gets worse  - OK to substitute medications based on formulary     History:  64M HTN, HLD, prior PE (6/2023) being seen for follow up pulmoanry nodules. Previously seen by Dr. Flako Horton in [place]. Last seen 10/2023. He is not prescribed any pulmonary medications. He is feeling well. He denies MARSHALL or SOB at rest. No chest pain or tightness. No wheezing. Recent cough w/ sinus infection, now improved. No chronic cough. No nocturnal cough. No orthopnea or PND. No LE edema. No F/C. No night sweats. No wt loss.     Smoking: never    Bird exposure: no             Animal exposure: no        Inhalation exposure: no    Occupation:                           10 point review of systems negative, aside from that mentioned in HPI.    BP (!) 153/91   Pulse 54   Resp 18   Wt 76.7 kg (169 lb)   SpO2 99%   BMI 24.96 kg/m    Gen: well-appearing  HEENT: anicteric  Card: RRR  Pulm: clear bilaterally   Abd: soft  MSK: no edema, no acute joint abnormality   Skin: no obvious rash  Psych: normal affect  Neuro: alert and oriented     Labs:  Personally reviewed    Imaging/Studies: Personally reviewed  CT Chest (7/2024) - stable subpleural scarring RUL and RLL, unchanged 9mm RUL nodule   PFTs (10/2023) - normal spirometry, e/o air-trapping w/ hyperinflation, elevated DLCO    Past Medical History:   Diagnosis Date    HTN (hypertension) 1/24/2013    Hyperlipidemia LDL goal <130 1/24/2013    Male erectile disorder 11/1/2016    Other acute pulmonary  embolism with acute cor pulmonale (H) 6/26/2023    Pneumonia      Past Surgical History:   Procedure Laterality Date    broken finger pinky [      COLONOSCOPY  2014    ORTHOPEDIC SURGERY      SOFT TISSUE SURGERY  2000    Torn left achilles tendon     Family History   Problem Relation Age of Onset    Diabetes Mother     Diabetes Father     Other Cancer Sister         Ovarian     Social History     Socioeconomic History    Marital status:      Spouse name: Not on file    Number of children: Not on file    Years of education: Not on file    Highest education level: Not on file   Occupational History    Not on file   Tobacco Use    Smoking status: Never    Smokeless tobacco: Never    Tobacco comments:     Cigar 3-4 times per year   Vaping Use    Vaping status: Never Used   Substance and Sexual Activity    Alcohol use: Yes     Alcohol/week: 2.5 - 3.3 standard drinks of alcohol    Drug use: No    Sexual activity: Not Currently   Other Topics Concern    Parent/sibling w/ CABG, MI or angioplasty before 65F 55M? No   Social History Narrative    Not on file     Social Drivers of Health     Financial Resource Strain: Low Risk  (1/10/2024)    Financial Resource Strain     Within the past 12 months, have you or your family members you live with been unable to get utilities (heat, electricity) when it was really needed?: No   Food Insecurity: Low Risk  (1/10/2024)    Food Insecurity     Within the past 12 months, did you worry that your food would run out before you got money to buy more?: No     Within the past 12 months, did the food you bought just not last and you didn t have money to get more?: No   Transportation Needs: Low Risk  (1/10/2024)    Transportation Needs     Within the past 12 months, has lack of transportation kept you from medical appointments, getting your medicines, non-medical meetings or appointments, work, or from getting things that you need?: No   Physical Activity: Not on file   Stress: Not on  file   Social Connections: Not on file   Interpersonal Safety: Not on file   Housing Stability: Low Risk  (1/10/2024)    Housing Stability     Do you have housing? : Yes     Are you worried about losing your housing?: No       45 minutes spent reviewing chart, reviewing test results, talking with and examining patient, formulating plan, and documentation on the day of the encounter.    Jacky Price MD  Pulmonary and Critical Care Medicine  [facility]

## 2024-12-18 NOTE — NURSING NOTE
Chief Complaint   Patient presents with    New Patient     Pulmonary nodules +1 more           Vitals:    12/18/24 0844 12/18/24 0846   BP: (!) 172/94 (!) 153/91   BP Location: Left arm    Patient Position: Sitting    Cuff Size: Adult Regular    Pulse: 54    Resp: 18    SpO2: 99%    Weight: 76.7 kg (169 lb)        Body mass index is 24.96 kg/m .

## 2024-12-26 ENCOUNTER — PATIENT OUTREACH (OUTPATIENT)
Dept: CARE COORDINATION | Facility: CLINIC | Age: 64
End: 2024-12-26
Payer: COMMERCIAL

## 2025-02-12 ENCOUNTER — MYC REFILL (OUTPATIENT)
Dept: INTERNAL MEDICINE | Facility: CLINIC | Age: 65
End: 2025-02-12
Payer: COMMERCIAL

## 2025-02-12 DIAGNOSIS — I10 ESSENTIAL HYPERTENSION, BENIGN: ICD-10-CM

## 2025-02-12 RX ORDER — LISINOPRIL 20 MG/1
20 TABLET ORAL DAILY
Qty: 90 TABLET | Refills: 0 | Status: SHIPPED | OUTPATIENT
Start: 2025-02-12

## 2025-02-28 ENCOUNTER — TRANSFERRED RECORDS (OUTPATIENT)
Dept: HEALTH INFORMATION MANAGEMENT | Facility: CLINIC | Age: 65
End: 2025-02-28
Payer: COMMERCIAL

## 2025-03-17 SDOH — HEALTH STABILITY: PHYSICAL HEALTH: ON AVERAGE, HOW MANY MINUTES DO YOU ENGAGE IN EXERCISE AT THIS LEVEL?: 30 MIN

## 2025-03-17 SDOH — HEALTH STABILITY: PHYSICAL HEALTH: ON AVERAGE, HOW MANY DAYS PER WEEK DO YOU ENGAGE IN MODERATE TO STRENUOUS EXERCISE (LIKE A BRISK WALK)?: 6 DAYS

## 2025-03-17 ASSESSMENT — SOCIAL DETERMINANTS OF HEALTH (SDOH): HOW OFTEN DO YOU GET TOGETHER WITH FRIENDS OR RELATIVES?: THREE TIMES A WEEK

## 2025-03-18 NOTE — PROGRESS NOTES
Preventive Care Visit  [facility]  Kasi Cassidy MD, Internal Medicine  Mar 19, 2025      Assessment & Plan     Encounter for routine adult health examination without abnormal findings  Fasting lab work discussed with patient.  - Lipid panel reflex to direct LDL Non-fasting; Future  - Comprehensive metabolic panel; Future  - CBC with platelets; Future    Essential hypertension, benign  Stable on therapy and continue with medical management  - Comprehensive metabolic panel; Future    Hyperlipidemia LDL goal <130  Labs ordered as fasting per routine  - Lipid panel reflex to direct LDL Non-fasting; Future    Screening PSA (prostate specific antigen)  Ordered as routine screening.  - Prostate Specific Antigen Screen; Future    Colon cancer screening  Prior colonoscopy reviewed.  - Fecal colorectal cancer screen FIT; Future    Immunity status testing  Check MMR status pending upcoming travel.  - Rubeola Antibody IgG; Future  - Mumps Immune Status, IgG  - Rubella Antibody IgG; Future    Other acute pulmonary embolism with acute cor pulmonale (H)  Continue with current anticoagulation.  Patient advised to stretch and wear support hose during his upcoming trip.    Travel advice encounter  Patient traveled to [facility].  Reviewed [employer] website.  There are no acute issues noted.  Discussed updating vaccinations inclusive of COVID, tetanus, pneumococcal.  Discussed foodborne illness and vaccination of hepatitis A and B.  Will check MMR titer as patient is convinced has been vaccinated and subsequently reports having had the infection.  Discussed malaria exposure.  He reports to me that he is going to a relatively firm denies all-inclusive site.      Patient has been advised of split billing requirements and indicates understanding: Yes        Counseling  Appropriate preventive services were addressed with this patient via screening, questionnaire, or discussion as appropriate for fall prevention,  nutrition, physical activity, Tobacco-use cessation, social engagement, weight loss and cognition.  Checklist reviewing preventive services available has been given to the patient.  Reviewed patient's diet, addressing concerns and/or questions.       See Patient Instructions    Ish Villaseñor is a 64 year old, presenting for the following:       HPI    Discuss updating covid vaccine. Traveling to [place] next week. Discuss Eliquis dosage with travel      Advance Care Planning  Patient does not have a Health Care Directive: Discussed advance care planning with patient; information given to patient to review.      3/17/2025   General Health   How would you rate your overall physical health? Good   Feel stress (tense, anxious, or unable to sleep) Only a little   (!) STRESS CONCERN      3/17/2025   Nutrition   Three or more servings of calcium each day? (!) I DON'T KNOW   Diet: Regular (no restrictions)   How many servings of fruit and vegetables per day? (!) 2-3   How many sweetened beverages each day? 0-1         3/17/2025   Exercise   Days per week of moderate/strenous exercise 6 days   Average minutes spent exercising at this level 30 min         3/17/2025   Social Factors   Frequency of gathering with friends or relatives Three times a week   Worry food won't last until get money to buy more No   Food not last or not have enough money for food? No   Do you have housing? (Housing is defined as stable permanent housing and does not include staying ouside in a car, in a tent, in an abandoned building, in an overnight shelter, or couch-surfing.) Yes   Are you worried about losing your housing? No   Lack of transportation? No   Unable to get utilities (heat,electricity)? No         3/17/2025   Fall Risk   Fallen 2 or more times in the past year? No   Trouble with walking or balance? No          3/17/2025   Dental   Dentist two times every year? Yes       Today's PHQ-2 Score:       3/18/2025    11:58 AM   PHQ-2  ( 1999 [employer])   Q1: Little interest or pleasure in doing things 0   Q2: Feeling down, depressed or hopeless 0   PHQ-2 Score 0    Q1: Little interest or pleasure in doing things Not at all   Q2: Feeling down, depressed or hopeless Not at all   PHQ-2 Score 0       Patient-reported           3/17/2025   Substance Use   Alcohol more than 3/day or more than 7/wk No   Do you use any other substances recreationally? No     Social History     Tobacco Use    Smoking status: Never    Smokeless tobacco: Never    Tobacco comments:     Cigar 3-4 times per year   Vaping Use    Vaping status: Never Used   Substance Use Topics    Alcohol use: Yes     Alcohol/week: 2.5 - 3.3 standard drinks of alcohol    Drug use: No           3/17/2025   STI Screening   New sexual partner(s) since last STI/HIV test? No   Last PSA:   PSA   Date Value Ref Range Status   06/04/2019 3.08 0 - 4 ug/L Final     Comment:     Assay Method:  Chemiluminescence using Siemens Vista analyzer     Prostate Specific Antigen Screen   Date Value Ref Range Status   01/11/2024 4.44 0.00 - 4.50 ng/mL Final   09/13/2022 4.26 (H) 0.00 - 4.00 ug/L Final     ASCVD Risk   The 10-year ASCVD risk score (Baljit ROGERS, et al., 2019) is: 15.7%    Values used to calculate the score:      Age: 64 years      Sex: Male      Is Non- : No      Diabetic: No      Tobacco smoker: No      Systolic Blood Pressure: 158 mmHg      Is BP treated: Yes      HDL Cholesterol: 85 mg/dL      Total Cholesterol: 228 mg/dL      Reviewed and updated as needed this visit by Provider                    Lab work is in process      Review of Systems  CONSTITUTIONAL: NEGATIVE for fever, chills, change in weight  EYES: NEGATIVE for vision changes or irritation  ENT/MOUTH: NEGATIVE for ear, mouth and throat problems  RESP: NEGATIVE for significant cough or SOB  CV: NEGATIVE for chest pain, palpitations or peripheral edema  GI: NEGATIVE for nausea, abdominal pain, heartburn, or  "change in bowel habits  : NEGATIVE for frequency, dysuria, or hematuria  MUSCULOSKELETAL: NEGATIVE for significant arthralgias or myalgia  NEURO: NEGATIVE for weakness, dizziness or paresthesias  ENDOCRINE: NEGATIVE for temperature intolerance, skin/hair changes  HEME: NEGATIVE for bleeding problems  PSYCHIATRIC: NEGATIVE for changes in mood or affect     Objective    Exam  /80   Pulse 90   Temp 97.2  F (36.2  C) (Temporal)   Resp 17   Ht 1.759 m (5' 9.25\")   Wt 76.6 kg (168 lb 14.4 oz)   SpO2 98%   BMI 24.76 kg/m     Estimated body mass index is 24.76 kg/m  as calculated from the following:    Height as of this encounter: 1.759 m (5' 9.25\").    Weight as of this encounter: 76.6 kg (168 lb 14.4 oz).    Physical Exam  GENERAL: alert and no distress  EYES: Eyes grossly normal to inspection, PERRL and conjunctivae and sclerae normal  HENT: ear canals and TM's normal, nose and mouth without ulcers or lesions  NECK: no adenopathy, no asymmetry, masses, or scars  RESP: lungs clear to auscultation - no rales, rhonchi or wheezes  CV: regular rate and rhythm, normal S1 S2, no S3 or S4, no murmur, click or rub, no peripheral edema  ABDOMEN: soft, nontender, no hepatosplenomegaly, no masses and bowel sounds normal  MS: no gross musculoskeletal defects noted, no edema  NEURO: No focal changes  PSYCH: mentation appears normal, affect normal/bright        Signed Electronically by: Kasi Cassidy MD    "

## 2025-03-19 ENCOUNTER — OFFICE VISIT (OUTPATIENT)
Dept: INTERNAL MEDICINE | Facility: CLINIC | Age: 65
End: 2025-03-19
Payer: COMMERCIAL

## 2025-03-19 VITALS
OXYGEN SATURATION: 98 % | SYSTOLIC BLOOD PRESSURE: 138 MMHG | HEIGHT: 69 IN | BODY MASS INDEX: 25.02 KG/M2 | TEMPERATURE: 97.2 F | WEIGHT: 168.9 LBS | RESPIRATION RATE: 17 BRPM | HEART RATE: 90 BPM | DIASTOLIC BLOOD PRESSURE: 80 MMHG

## 2025-03-19 DIAGNOSIS — Z00.00 ENCOUNTER FOR ROUTINE ADULT HEALTH EXAMINATION WITHOUT ABNORMAL FINDINGS: Primary | ICD-10-CM

## 2025-03-19 DIAGNOSIS — I10 ESSENTIAL HYPERTENSION, BENIGN: ICD-10-CM

## 2025-03-19 DIAGNOSIS — I26.09 OTHER ACUTE PULMONARY EMBOLISM WITH ACUTE COR PULMONALE (H): ICD-10-CM

## 2025-03-19 DIAGNOSIS — E78.5 HYPERLIPIDEMIA LDL GOAL <130: ICD-10-CM

## 2025-03-19 DIAGNOSIS — Z00.00 ROUTINE GENERAL MEDICAL EXAMINATION AT A HEALTH CARE FACILITY: ICD-10-CM

## 2025-03-19 DIAGNOSIS — Z12.5 SCREENING PSA (PROSTATE SPECIFIC ANTIGEN): ICD-10-CM

## 2025-03-19 DIAGNOSIS — Z12.11 COLON CANCER SCREENING: ICD-10-CM

## 2025-03-19 DIAGNOSIS — Z71.84 TRAVEL ADVICE ENCOUNTER: ICD-10-CM

## 2025-03-19 DIAGNOSIS — Z01.84 IMMUNITY STATUS TESTING: ICD-10-CM

## 2025-03-19 LAB
CHOLEST SERPL-MCNC: 205 MG/DL
ERYTHROCYTE [DISTWIDTH] IN BLOOD BY AUTOMATED COUNT: 15.7 % (ref 10–15)
FASTING STATUS PATIENT QL REPORTED: YES
HCT VFR BLD AUTO: 46.2 % (ref 40–53)
HDLC SERPL-MCNC: 81 MG/DL
HGB BLD-MCNC: 16.4 G/DL (ref 13.3–17.7)
LDLC SERPL CALC-MCNC: 113 MG/DL
MCH RBC QN AUTO: 35 PG (ref 26.5–33)
MCHC RBC AUTO-ENTMCNC: 35.5 G/DL (ref 31.5–36.5)
MCV RBC AUTO: 99 FL (ref 78–100)
NONHDLC SERPL-MCNC: 124 MG/DL
PLATELET # BLD AUTO: 135 10E3/UL (ref 150–450)
PSA SERPL DL<=0.01 NG/ML-MCNC: 3.58 NG/ML (ref 0–4.5)
RBC # BLD AUTO: 4.69 10E6/UL (ref 4.4–5.9)
TRIGL SERPL-MCNC: 55 MG/DL
WBC # BLD AUTO: 4.8 10E3/UL (ref 4–11)

## 2025-03-20 LAB
ALBUMIN SERPL BCG-MCNC: 4.5 G/DL (ref 3.5–5.2)
ALP SERPL-CCNC: 62 U/L (ref 40–150)
ALT SERPL W P-5'-P-CCNC: 27 U/L (ref 0–70)
ANION GAP SERPL CALCULATED.3IONS-SCNC: 15 MMOL/L (ref 7–15)
AST SERPL W P-5'-P-CCNC: 20 U/L (ref 0–45)
BILIRUB SERPL-MCNC: 1.6 MG/DL
BUN SERPL-MCNC: 12.1 MG/DL (ref 8–23)
CALCIUM SERPL-MCNC: ABNORMAL MG/DL
CHLORIDE SERPL-SCNC: 101 MMOL/L (ref 98–107)
CREAT SERPL-MCNC: 0.84 MG/DL (ref 0.67–1.17)
EGFRCR SERPLBLD CKD-EPI 2021: >90 ML/MIN/1.73M2
FASTING STATUS PATIENT QL REPORTED: YES
GLUCOSE SERPL-MCNC: 120 MG/DL (ref 70–99)
HCO3 SERPL-SCNC: 25 MMOL/L (ref 22–29)
MEV IGG SER IA-ACNC: >300 AU/ML
MEV IGG SER IA-ACNC: POSITIVE
MUMPS ANTIBODY IGG INSTRUMENT VALUE: 94.5 AU/ML
MUV IGG SER QL IA: POSITIVE
POTASSIUM SERPL-SCNC: 5 MMOL/L (ref 3.4–5.3)
PROT SERPL-MCNC: 7 G/DL (ref 6.4–8.3)
RUBV IGG SERPL QL IA: 23.9 INDEX
RUBV IGG SERPL QL IA: POSITIVE
SODIUM SERPL-SCNC: 141 MMOL/L (ref 135–145)

## 2025-03-25 ENCOUNTER — MYC MEDICAL ADVICE (OUTPATIENT)
Dept: INTERNAL MEDICINE | Facility: CLINIC | Age: 65
End: 2025-03-25
Payer: COMMERCIAL

## 2025-05-18 ENCOUNTER — MYC REFILL (OUTPATIENT)
Dept: INTERNAL MEDICINE | Facility: CLINIC | Age: 65
End: 2025-05-18
Payer: COMMERCIAL

## 2025-05-18 DIAGNOSIS — I10 ESSENTIAL HYPERTENSION, BENIGN: ICD-10-CM

## 2025-05-19 RX ORDER — LISINOPRIL 20 MG/1
20 TABLET ORAL DAILY
Qty: 90 TABLET | Refills: 2 | Status: SHIPPED | OUTPATIENT
Start: 2025-05-19

## 2025-07-07 ENCOUNTER — HOSPITAL ENCOUNTER (OUTPATIENT)
Dept: CT IMAGING | Facility: CLINIC | Age: 65
Discharge: HOME | End: 2025-07-07
Attending: STUDENT IN AN ORGANIZED HEALTH CARE EDUCATION/TRAINING PROGRAM | Admitting: STUDENT IN AN ORGANIZED HEALTH CARE EDUCATION/TRAINING PROGRAM
Payer: COMMERCIAL

## 2025-07-07 DIAGNOSIS — R91.8 PULMONARY NODULES: ICD-10-CM

## 2025-07-07 PROCEDURE — 71250 CT THORAX DX C-: CPT

## 2025-07-16 ENCOUNTER — MYC MEDICAL ADVICE (OUTPATIENT)
Dept: ONCOLOGY | Facility: CLINIC | Age: 65
End: 2025-07-16
Payer: COMMERCIAL

## 2025-07-17 NOTE — TELEPHONE ENCOUNTER
I called [facility] to clarify if the patient has a copay card for Eliquis. I spoke with Vera, his copay card is not working anymore. Id: [id number] is his old ID for the copay card, he last used this on 4/14/25.    Spoke with  Kourtney, [phone], she said she is not able to look up his account without the patients permission, she thinks he just needs to reactivate his card.    I called the patient and left voicemail to call me back to discuss.    I spoke with Kasi, he needed to reactivate his card but was not able to online or via [email or web address]. I was able to reactivate the card online for the patient, [facility] is filling this now for a $30 copay for 3 month supply.    COPAY CARD OBTAINED    Medication: ELIQUIS 5 MG PO TABS  Sponsor: [employer]  Member ID: [id number]  Expected Copay: $  400  Copay card Monthly Max Amount: $  30 for the 3 month supply  Copay card Annual Amount: $ 6,400

## 2025-07-30 ENCOUNTER — ONCOLOGY VISIT (OUTPATIENT)
Dept: ONCOLOGY | Facility: CLINIC | Age: 65
End: 2025-07-30
Attending: INTERNAL MEDICINE
Payer: COMMERCIAL

## 2025-07-30 ENCOUNTER — LAB (OUTPATIENT)
Dept: INFUSION THERAPY | Facility: CLINIC | Age: 65
End: 2025-07-30
Attending: INTERNAL MEDICINE
Payer: COMMERCIAL

## 2025-07-30 VITALS
SYSTOLIC BLOOD PRESSURE: 147 MMHG | BODY MASS INDEX: 24.57 KG/M2 | OXYGEN SATURATION: 95 % | HEART RATE: 94 BPM | TEMPERATURE: 98.8 F | DIASTOLIC BLOOD PRESSURE: 94 MMHG | WEIGHT: 167.6 LBS | RESPIRATION RATE: 16 BRPM

## 2025-07-30 DIAGNOSIS — D72.819 LEUKOPENIA, UNSPECIFIED TYPE: ICD-10-CM

## 2025-07-30 DIAGNOSIS — I26.09 OTHER ACUTE PULMONARY EMBOLISM WITH ACUTE COR PULMONALE (H): ICD-10-CM

## 2025-07-30 DIAGNOSIS — E78.5 HYPERLIPIDEMIA LDL GOAL <130: Primary | ICD-10-CM

## 2025-07-30 LAB
ALBUMIN SERPL BCG-MCNC: 4.3 G/DL (ref 3.5–5.2)
ALP SERPL-CCNC: 64 U/L (ref 40–150)
ALT SERPL W P-5'-P-CCNC: 21 U/L (ref 0–70)
ANION GAP SERPL CALCULATED.3IONS-SCNC: 16 MMOL/L (ref 7–15)
AST SERPL W P-5'-P-CCNC: 17 U/L (ref 0–45)
BASOPHILS # BLD AUTO: 0 10E3/UL (ref 0–0.2)
BASOPHILS NFR BLD AUTO: 0 %
BILIRUB SERPL-MCNC: 0.9 MG/DL
BUN SERPL-MCNC: 7.5 MG/DL (ref 8–23)
CALCIUM SERPL-MCNC: 9.3 MG/DL (ref 8.8–10.4)
CHLORIDE SERPL-SCNC: 102 MMOL/L (ref 98–107)
CREAT SERPL-MCNC: 0.73 MG/DL (ref 0.67–1.17)
EGFRCR SERPLBLD CKD-EPI 2021: >90 ML/MIN/1.73M2
EOSINOPHIL # BLD AUTO: 0 10E3/UL (ref 0–0.7)
EOSINOPHIL NFR BLD AUTO: 1 %
ERYTHROCYTE [DISTWIDTH] IN BLOOD BY AUTOMATED COUNT: 16 % (ref 10–15)
GLUCOSE SERPL-MCNC: 94 MG/DL (ref 70–99)
HCO3 SERPL-SCNC: 22 MMOL/L (ref 22–29)
HCT VFR BLD AUTO: 44.4 % (ref 40–53)
HGB BLD-MCNC: 15.4 G/DL (ref 13.3–17.7)
IMM GRANULOCYTES # BLD: 0 10E3/UL
IMM GRANULOCYTES NFR BLD: 0 %
LYMPHOCYTES # BLD AUTO: 1 10E3/UL (ref 0.8–5.3)
LYMPHOCYTES NFR BLD AUTO: 30 %
MCH RBC QN AUTO: 35.2 PG (ref 26.5–33)
MCHC RBC AUTO-ENTMCNC: 34.7 G/DL (ref 31.5–36.5)
MCV RBC AUTO: 102 FL (ref 78–100)
MONOCYTES # BLD AUTO: 0.3 10E3/UL (ref 0–1.3)
MONOCYTES NFR BLD AUTO: 10 %
NEUTROPHILS # BLD AUTO: 2 10E3/UL (ref 1.6–8.3)
NEUTROPHILS NFR BLD AUTO: 58 %
NRBC # BLD AUTO: 0 10E3/UL
NRBC BLD AUTO-RTO: 0 /100
PLATELET # BLD AUTO: 168 10E3/UL (ref 150–450)
POTASSIUM SERPL-SCNC: 4.5 MMOL/L (ref 3.4–5.3)
PROT SERPL-MCNC: 6.6 G/DL (ref 6.4–8.3)
RBC # BLD AUTO: 4.37 10E6/UL (ref 4.4–5.9)
SODIUM SERPL-SCNC: 140 MMOL/L (ref 135–145)
WBC # BLD AUTO: 3.4 10E3/UL (ref 4–11)

## 2025-07-30 PROCEDURE — G0463 HOSPITAL OUTPT CLINIC VISIT: HCPCS | Performed by: INTERNAL MEDICINE

## 2025-07-30 PROCEDURE — 85041 AUTOMATED RBC COUNT: CPT | Performed by: INTERNAL MEDICINE

## 2025-07-30 PROCEDURE — 85004 AUTOMATED DIFF WBC COUNT: CPT | Performed by: INTERNAL MEDICINE

## 2025-07-30 PROCEDURE — 36415 COLL VENOUS BLD VENIPUNCTURE: CPT

## 2025-07-30 PROCEDURE — 84155 ASSAY OF PROTEIN SERUM: CPT | Performed by: INTERNAL MEDICINE

## 2025-07-30 PROCEDURE — 99214 OFFICE O/P EST MOD 30 MIN: CPT | Performed by: INTERNAL MEDICINE

## 2025-07-30 ASSESSMENT — PAIN SCALES - GENERAL: PAINLEVEL_OUTOF10: NO PAIN (0)

## 2025-07-30 NOTE — PATIENT INSTRUCTIONS
-Continue eliquis. Reduce it to 2.5 mg twice a day.  -Follow-up with primary doctor and have CBC checked once a year.  -Recheck BP. Done sjs

## 2025-07-30 NOTE — NURSING NOTE
"Oncology Rooming Note    July 30, 2025 9:04 AM   Nish Krause is a 64 year old male who presents for:    Chief Complaint   Patient presents with    Oncology Clinic Visit     Initial Vitals: BP (!) 150/93   Pulse 94   Temp 98.8  F (37.1  C) (Oral)   Resp 16   Wt 76 kg (167 lb 9.6 oz)   SpO2 95%   BMI 24.57 kg/m   Estimated body mass index is 24.57 kg/m  as calculated from the following:    Height as of 3/19/25: 1.759 m (5' 9.25\").    Weight as of this encounter: 76 kg (167 lb 9.6 oz). Body surface area is 1.93 meters squared.  No Pain (0) Comment: Data Unavailable   No LMP for male patient.  Allergies reviewed: Yes  Medications reviewed: Yes    Medications: Medication refills not needed today.  Pharmacy name entered into EPIC:    [id number] - [facility] [place] [place] - [address] AT [facility] [address] [facility]  [id number] - [place], [place] - [address] AT [facility] [address]  [facility] - [place], [place] - [address]    PHQ9:  Did this patient require a PHQ9?: No      Clinical concerns: follow up        Estrella Del Angel            "

## 2025-07-30 NOTE — LETTER
7/30/2025      Kasi Krause  [address] [id number]      Dear Colleague,    Thank you for referring your patient, Kasi Krause, to the [facility]. Please see a copy of my visit note below.    HEMATOLOGY HISTORY: Mr. Krause is a gentleman with recurrent thrombosis.     1.  In 12/2014, patient had pain in the left Achilles tendon/leg. He was evaluated by Orthopedics and was in CAM boot starting beginning of january'15.   -Ultrasound of the left lower extremity on 01/19/2015 revealed extensive occlusive acute/subacute deep vein thrombosis in the left lower extremity involving superficial femoral vein and extending into popliteal vein and posterior tibial vein.   -Warfarin for 3 months for this provoked DVT.        2.  On 02/16/2015, negative for factor V Leiden mutation and prothrombin gene mutation.      3. On 06/03/2015:  -Normal protein S.  -Normal protein C.  -Normal anti-thrombin III.  -Lupus is negative.  -Normal cardiolipin antibody IgG and IgM.  -Ultrasound of the left lower extremity on 06/03/2015 revealed mild chronic nonocclusive residual deep vein thrombosis  within the left popliteal vein.        4. Patient went to [place] between 05/11/2023 and 05/25/2023.  Flights were about 9 hours each way.    -On 06/24/2023, started to have pain mainly in the right side of the chest.       5. On 06/26/2023:  -Elevated D-dimer.  -CT chest angiogram revealed bilateral pulmonary embolism, left worse than right.  There is hepatic steatosis.  -Echocardiogram revealed EF of 55-60%. Right ventricle is mildly dilated.  Normal right ventricular systolic function.    -No leg US done at that time.     6.  CT chest, abdomen and pelvis on 09/12/2023 did not reveal any pulmonary embolism.  No evidence of malignancy.  -Follow up bilateral lower extremity ultrasound on 09/12/2023 reveals bilateral lower extremity DVT.  Thrombus in left popliteal vein is chronic DVT.  Right lower  extremity DVT was new.  This was unprovoked.     SUBJECTIVE:   Mr. Castillo is a 64-year-old gentleman with recurrent thrombosis as described above.  He is on Eliquis.  He is tolerating it well.  No bleeding or bruising complications.     CT chest without contrast on 07/07/2025: The right upper lobe nodular opacity is calcified and should reflect a benign granulomatous process. No suspicious nodule/mass.      Patient is doing good.  No excessive fatigue.  No headache.  No dizziness.  No chest pain.  No shortness of breath at rest.  No abdominal pain.  No nausea or vomiting.  No urinary or bowel complaints.       EXAM:  He is alert and oriented x 3. Not in any distress.  Vitals: Reviewed.  Rest of the system not examined.     ASSESSMENT:  1.  A 64-year-old gentleman with recurrent thrombosis:  -Left lower extremity deep venous thrombosis diagnosed on 01/19/2015. Provoked from wearing a Cam boot for Achilles tendonitis.  It has become chronic DVT.  -Bilateral pulmonary embolism diagnosed on 06/26/2023. Unprovoked. ? Provoked from flying to [place] in 05/[place]23.  -Right lower extremity DVT diagnosed on 09/12/2023. Unprovoked.  2.  Lung nodule, stable. Benign.  3.  Fatty liver.  4.  Intermittent leukopenia.     PLAN:  -Continue eliquis. Reduce it to 2.5 mg twice a day.  -Follow-up with primary doctor and have CBC checked once a year.     DISCUSSION:  1.  Patient is doing well.  He is pretty asymptomatic.    Discussed with him regarding recurrent thrombosis.  He has not had any new thrombosis. Last thrombosis was in September 2023.    Plan is for indefinite anticoagulation.  Patient is on Eliquis 5 mg twice a day. Discussed regarding reducing the dose to 2.5 mg twice a day for extended prophylaxis.  He is agreeable for it.  New prescription sent.    Explained to the patient that no need for routine follow-up ultrasound or CT scan. Scans will be done as clinically indicated.    For thrombosis, he will follow-up with his PCP  and have Eliquis renewed through them.    General ways to reduce thrombosis discussed.  Advised him to be active.  Advised him to ambulate every 1-2 hours if he does on prolonged car ride or plane ride.    2.  CT chest reviewed.  Lung nodule has calcified and is considered benign.  No follow-up CT scan needed.    3.  CBC reviewed.  He has mild intermittent leukopenia.  No neutropenia.  Hemoglobin and platelet are normal.    Patient advised to have CBC checked once a year with his PCP.  If there is progressive worsening cytopenia, he will see us.    4.  Patient advised to follow-up with his PCP.  He will return to hematology/oncology clinic as needed.  Advised him to see a physician if he has bleeding from any site, trauma, black stool, chest pain, shortness of breath, pain/swelling/redness in the extremities or any other concerns.    Total visit time of 30 minutes.  Time spent in today's visit, review of chart/investigations today and documentation today.    Again, thank you for allowing me to participate in the care of your patient.        Sincerely,        Jared Gonzalez MD    Electronically signed

## 2025-07-30 NOTE — LETTER
7/30/2025         RE: Kasi Krause  [address] [address] [address]      HEMATOLOGY HISTORY: Mr. Krause is a gentleman with recurrent thrombosis.     1.  In 12/2014, patient had pain in the left Achilles tendon/leg. He was evaluated by Orthopedics and was in CAM boot starting beginning of january'15.   -Ultrasound of the left lower extremity on 01/19/2015 revealed extensive occlusive acute/subacute deep vein thrombosis in the left lower extremity involving superficial femoral vein and extending into popliteal vein and posterior tibial vein.   -Warfarin for 3 months for this provoked DVT.        2.  On 02/16/2015, negative for factor V Leiden mutation and prothrombin gene mutation.      3. On 06/03/2015:  -Normal protein S.  -Normal protein C.  -Normal anti-thrombin III.  -Lupus is negative.  -Normal cardiolipin antibody IgG and IgM.  -Ultrasound of the left lower extremity on 06/03/2015 revealed mild chronic nonocclusive residual deep vein thrombosis  within the left popliteal vein.        4. Patient went to [place] between 05/11/2023 and 05/25/2023.  Flights were about 9 hours each way.    -On 06/24/2023, started to have pain mainly in the right side of the chest.       5. On 06/26/2023:  -Elevated D-dimer.  -CT chest angiogram revealed bilateral pulmonary embolism, left worse than right.  There is hepatic steatosis.  -Echocardiogram revealed EF of 55-60%. Right ventricle is mildly dilated.  Normal right ventricular systolic function.    -No leg US done at that time.     6.  CT chest, abdomen and pelvis on 09/12/2023 did not reveal any pulmonary embolism.  No evidence of malignancy.  -Follow up bilateral lower extremity ultrasound on 09/12/2023 reveals bilateral lower extremity DVT.  Thrombus in left popliteal vein is chronic DVT.  Right lower extremity DVT was new.  This was unprovoked.     SUBJECTIVE:   Mr. Castillo is a 64-year-old gentleman with recurrent thrombosis as described above.  He is on  Eliquis.  He is tolerating it well.  No bleeding or bruising complications.     CT chest without contrast on 07/07/2025: The right upper lobe nodular opacity is calcified and should reflect a benign granulomatous process. No suspicious nodule/mass.      Patient is doing good.  No excessive fatigue.  No headache.  No dizziness.  No chest pain.  No shortness of breath at rest.  No abdominal pain.  No nausea or vomiting.  No urinary or bowel complaints.       EXAM:  He is alert and oriented x 3. Not in any distress.  Vitals: Reviewed.  Rest of the system not examined.     ASSESSMENT:  1.  A 64-year-old gentleman with recurrent thrombosis:  -Left lower extremity deep venous thrombosis diagnosed on 01/19/2015. Provoked from wearing a Cam boot for Achilles tendonitis.  It has become chronic DVT.  -Bilateral pulmonary embolism diagnosed on 06/26/2023. Unprovoked. ? Provoked from flying to [place] in 05/2023.  -Right lower extremity DVT diagnosed on 09/12/2023. Unprovoked.  2.  Lung nodule, stable. Benign.  3.  Fatty liver.  4.  Intermittent leukopenia.     PLAN:  -Continue eliquis. Reduce it to 2.5 mg twice a day.  -Follow-up with primary doctor and have CBC checked once a year.     DISCUSSION:  1.  Patient is doing well.  He is pretty asymptomatic.    Discussed with him regarding recurrent thrombosis.  He has not had any new thrombosis. Last thrombosis was in September 2023.    Plan is for indefinite anticoagulation.  Patient is on Eliquis 5 mg twice a day. Discussed regarding reducing the dose to 2.5 mg twice a day for extended prophylaxis.  He is agreeable for it.  New prescription sent.    Explained to the patient that no need for routine follow-up ultrasound or CT scan. Scans will be done as clinically indicated.    For thrombosis, he will follow-up with his PCP and have Eliquis renewed through them.    General ways to reduce thrombosis discussed.  Advised him to be active.  Advised him to ambulate every 1-2 hours if  he does on prolonged car ride or plane ride.    2.  CT chest reviewed.  Lung nodule has calcified and is considered benign.  No follow-up CT scan needed.    3.  CBC reviewed.  He has mild intermittent leukopenia.  No neutropenia.  Hemoglobin and platelet are normal.    Patient advised to have CBC checked once a year with his PCP.  If there is progressive worsening cytopenia, he will see us.    4.  Patient advised to follow-up with his PCP.  He will return to hematology/oncology clinic as needed.  Advised him to see a physician if he has bleeding from any site, trauma, black stool, chest pain, shortness of breath, pain/swelling/redness in the extremities or any other concerns.    Total visit time of 30 minutes.  Time spent in today's visit, review of chart/investigations today and documentation today.      Jared Gonzalez MD

## 2025-07-30 NOTE — PROGRESS NOTES
Medical Assistant Note:  Kasi Krause presents today for lab draw.    Patient seen by provider today: No.   present during visit today: Not Applicable.    Concerns: No Concerns.    Procedure:  Lab draw site: LAC, Needle type: BF, Gauge: 21. Gauze and coban applied    Post Assessment:  Labs drawn without difficulty: Yes.    Discharge Plan:  Departure Mode: Ambulatory.    Face to Face Time: 5.    Renetta Garcia CMA

## 2025-07-30 NOTE — PROGRESS NOTES
HEMATOLOGY HISTORY: Mr. Krause is a gentleman with recurrent thrombosis.     1.  In 12/2014, patient had pain in the left Achilles tendon/leg. He was evaluated by Orthopedics and was in CAM boot starting beginning of january'15.   -Ultrasound of the left lower extremity on 01/19/2015 revealed extensive occlusive acute/subacute deep vein thrombosis in the left lower extremity involving superficial femoral vein and extending into popliteal vein and posterior tibial vein.   -Warfarin for 3 months for this provoked DVT.        2.  On 02/16/2015, negative for factor V Leiden mutation and prothrombin gene mutation.      3. On 06/03/2015:  -Normal protein S.  -Normal protein C.  -Normal anti-thrombin III.  -Lupus is negative.  -Normal cardiolipin antibody IgG and IgM.  -Ultrasound of the left lower extremity on 06/03/2015 revealed mild chronic nonocclusive residual deep vein thrombosis  within the left popliteal vein.        4. Patient went to [place] between 05/11/2023 and 05/25/2023.  Flights were about 9 hours each way.    -On 06/24/2023, started to have pain mainly in the right side of the chest.       5. On 06/26/2023:  -Elevated D-dimer.  -CT chest angiogram revealed bilateral pulmonary embolism, left worse than right.  There is hepatic steatosis.  -Echocardiogram revealed EF of 55-60%. Right ventricle is mildly dilated.  Normal right ventricular systolic function.    -No leg US done at that time.     6.  CT chest, abdomen and pelvis on 09/12/2023 did not reveal any pulmonary embolism.  No evidence of malignancy.  -Follow up bilateral lower extremity ultrasound on 09/12/2023 reveals bilateral lower extremity DVT.  Thrombus in left popliteal vein is chronic DVT.  Right lower extremity DVT was new.  This was unprovoked.     SUBJECTIVE:   Mr. Castillo is a 64-year-old gentleman with recurrent thrombosis as described above.  He is on Eliquis.  He is tolerating it well.  No bleeding or bruising complications.     CT chest  without contrast on 07/07/2025: The right upper lobe nodular opacity is calcified and should reflect a benign granulomatous process. No suspicious nodule/mass.      Patient is doing good.  No excessive fatigue.  No headache.  No dizziness.  No chest pain.  No shortness of breath at rest.  No abdominal pain.  No nausea or vomiting.  No urinary or bowel complaints.       EXAM:  He is alert and oriented x 3. Not in any distress.  Vitals: Reviewed.  Rest of the system not examined.     ASSESSMENT:  1.  A 64-year-old gentleman with recurrent thrombosis:  -Left lower extremity deep venous thrombosis diagnosed on 01/19/2015. Provoked from wearing a Cam boot for Achilles tendonitis.  It has become chronic DVT.  -Bilateral pulmonary embolism diagnosed on 06/26/2023. Unprovoked. ? Provoked from flying to [place] in 05/2023.  -Right lower extremity DVT diagnosed on 09/12/2023. Unprovoked.  2.  Lung nodule, stable. Benign.  3.  Fatty liver.  4.  Intermittent leukopenia.     PLAN:  -Continue eliquis. Reduce it to 2.5 mg twice a day.  -Follow-up with primary doctor and have CBC checked once a year.     DISCUSSION:  1.  Patient is doing well.  He is pretty asymptomatic.    Discussed with him regarding recurrent thrombosis.  He has not had any new thrombosis. Last thrombosis was in September 2023.    Plan is for indefinite anticoagulation.  Patient is on Eliquis 5 mg twice a day. Discussed regarding reducing the dose to 2.5 mg twice a day for extended prophylaxis.  He is agreeable for it.  New prescription sent.    Explained to the patient that no need for routine follow-up ultrasound or CT scan. Scans will be done as clinically indicated.    For thrombosis, he will follow-up with his PCP and have Eliquis renewed through them.    General ways to reduce thrombosis discussed.  Advised him to be active.  Advised him to ambulate every 1-2 hours if he does on prolonged car ride or plane ride.    2.  CT chest reviewed.  Lung nodule has  calcified and is considered benign.  No follow-up CT scan needed.    3.  CBC reviewed.  He has mild intermittent leukopenia.  No neutropenia.  Hemoglobin and platelet are normal.    Patient advised to have CBC checked once a year with his PCP.  If there is progressive worsening cytopenia, he will see us.    4.  Patient advised to follow-up with his PCP.  He will return to hematology/oncology clinic as needed.  Advised him to see a physician if he has bleeding from any site, trauma, black stool, chest pain, shortness of breath, pain/swelling/redness in the extremities or any other concerns.    Total visit time of 30 minutes.  Time spent in today's visit, review of chart/investigations today and documentation today.